# Patient Record
Sex: FEMALE | Race: WHITE | NOT HISPANIC OR LATINO | Employment: FULL TIME | URBAN - METROPOLITAN AREA
[De-identification: names, ages, dates, MRNs, and addresses within clinical notes are randomized per-mention and may not be internally consistent; named-entity substitution may affect disease eponyms.]

---

## 2017-05-10 ENCOUNTER — HOSPITAL ENCOUNTER (EMERGENCY)
Facility: HOSPITAL | Age: 37
Discharge: HOME | End: 2017-05-10
Attending: EMERGENCY MEDICINE
Payer: COMMERCIAL

## 2017-05-10 VITALS
TEMPERATURE: 98 F | SYSTOLIC BLOOD PRESSURE: 129 MMHG | OXYGEN SATURATION: 99 % | HEART RATE: 87 BPM | RESPIRATION RATE: 18 BRPM | BODY MASS INDEX: 25.71 KG/M2 | HEIGHT: 66 IN | WEIGHT: 160 LBS | DIASTOLIC BLOOD PRESSURE: 91 MMHG

## 2017-05-10 DIAGNOSIS — N92.6 ABNORMAL MENSTRUAL CYCLE: ICD-10-CM

## 2017-05-10 DIAGNOSIS — R10.31 RIGHT LOWER QUADRANT ABDOMINAL PAIN: Primary | ICD-10-CM

## 2017-05-10 DIAGNOSIS — N30.01 ACUTE CYSTITIS WITH HEMATURIA: ICD-10-CM

## 2017-05-10 LAB
AMORPH CRY URNS QL MICRO: ABNORMAL
B-HCG UR QL: NEGATIVE
BACTERIA #/AREA URNS HPF: ABNORMAL /HPF
BACTERIA GENITAL QL WET PREP: ABNORMAL
BILIRUB UR QL STRIP: NEGATIVE
CLARITY UR: ABNORMAL
CLUE CELLS VAG QL WET PREP: ABNORMAL
COLOR UR: YELLOW
CTP QC/QA: YES
FILAMENT FUNGI VAG WET PREP-#/AREA: ABNORMAL
GLUCOSE UR QL STRIP: NEGATIVE
HGB UR QL STRIP: ABNORMAL
KETONES UR QL STRIP: NEGATIVE
LEUKOCYTE ESTERASE UR QL STRIP: ABNORMAL
MICROSCOPIC COMMENT: ABNORMAL
NITRITE UR QL STRIP: NEGATIVE
NON-SQ EPI CELLS #/AREA URNS HPF: 1 /HPF
PH UR STRIP: 7 [PH] (ref 5–8)
PROT UR QL STRIP: NEGATIVE
RBC #/AREA URNS HPF: 4 /HPF (ref 0–4)
SP GR UR STRIP: 1.02 (ref 1–1.03)
SPECIMEN SOURCE: ABNORMAL
SQUAMOUS #/AREA URNS HPF: 6 /HPF
T VAGINALIS GENITAL QL WET PREP: ABNORMAL
URN SPEC COLLECT METH UR: ABNORMAL
UROBILINOGEN UR STRIP-ACNC: ABNORMAL EU/DL
WBC #/AREA URNS HPF: 20 /HPF (ref 0–5)
WBC #/AREA VAG WET PREP: ABNORMAL
YEAST GENITAL QL WET PREP: ABNORMAL

## 2017-05-10 PROCEDURE — 87210 SMEAR WET MOUNT SALINE/INK: CPT

## 2017-05-10 PROCEDURE — 87591 N.GONORRHOEAE DNA AMP PROB: CPT

## 2017-05-10 PROCEDURE — 81000 URINALYSIS NONAUTO W/SCOPE: CPT

## 2017-05-10 PROCEDURE — 25000003 PHARM REV CODE 250: Performed by: NURSE PRACTITIONER

## 2017-05-10 PROCEDURE — 99284 EMERGENCY DEPT VISIT MOD MDM: CPT | Mod: 25

## 2017-05-10 PROCEDURE — 87086 URINE CULTURE/COLONY COUNT: CPT

## 2017-05-10 PROCEDURE — 81025 URINE PREGNANCY TEST: CPT | Performed by: EMERGENCY MEDICINE

## 2017-05-10 RX ORDER — AMLODIPINE AND OLMESARTAN MEDOXOMIL 10; 40 MG/1; MG/1
1 TABLET ORAL DAILY
COMMUNITY
End: 2018-01-03

## 2017-05-10 RX ORDER — ONDANSETRON 4 MG/1
4 TABLET, ORALLY DISINTEGRATING ORAL
Status: COMPLETED | OUTPATIENT
Start: 2017-05-10 | End: 2017-05-10

## 2017-05-10 RX ORDER — CEPHALEXIN 500 MG/1
500 CAPSULE ORAL EVERY 12 HOURS
Qty: 10 CAPSULE | Refills: 0 | Status: SHIPPED | OUTPATIENT
Start: 2017-05-10 | End: 2017-05-15

## 2017-05-10 RX ORDER — ONDANSETRON 4 MG/1
4 TABLET, FILM COATED ORAL EVERY 8 HOURS PRN
Qty: 12 TABLET | Refills: 0 | Status: SHIPPED | OUTPATIENT
Start: 2017-05-10 | End: 2018-01-03

## 2017-05-10 RX ORDER — DICYCLOMINE HYDROCHLORIDE 20 MG/1
20 TABLET ORAL 2 TIMES DAILY PRN
Qty: 30 TABLET | Refills: 0 | Status: SHIPPED | OUTPATIENT
Start: 2017-05-10 | End: 2018-01-03

## 2017-05-10 RX ADMIN — ONDANSETRON 4 MG: 4 TABLET, ORALLY DISINTEGRATING ORAL at 03:05

## 2017-05-10 NOTE — DISCHARGE INSTRUCTIONS
Please return to the Emergency Department for any new or worsening symptoms including: worsening abdominal pain or changes in your abdominal pain, pain with walking, any fever, chest pain, shortness of breath, loss of consciousness, dizziness, weakness, or any other concerns.     Please follow up with Ob/GYN/. Call for an appointment Tomorrow (5/11/17).If you do not have an OB/GYN, you may contact the one listed on your discharge paperwork or you may also call the [facility] Appointment Desk at [phone] to schedule an appointment with an OB/GYN.     Please take all medication as prescribed. You have been prescribed Naproxen for pain. This is an Non-Steroidal Anti-Inflammatory (NSAID) Medication. Please do not take any additional NSAIDs while you are taking this medication including (Advil, Aleve, Motrin, Ibuprofen, Mobic\meloxicam, Naprosyn, etc.). Please stop taking this medication if you experience: weakness, itching, yellow skin or eyes, joint pains, vomiting blood, blood or black stools, unusual weight gain, or swelling in your arms, legs, hands, or feet.

## 2017-05-10 NOTE — ED AVS SNAPSHOT
[facility]  [place] [address]               Jyotsna Archuleta   5/10/2017  2:04 PM   ED    Description:  Female : 1980   Department:  [facility]           Your Care was Coordinated By:     Provider Role From To    Inder Santiago MD Attending Provider 05/10/17 5568 --    GRISEL Rowe Nurse Practitioner 05/10/17 686 --      Reason for Visit     Abdominal Pain           Diagnoses this Visit        Comments    Right lower quadrant abdominal pain    -  Primary     Abnormal menstrual cycle           ED Disposition     ED Disposition Condition Comment    Discharge             To Do List           Follow-up Information     Schedule an appointment as soon as possible for a visit with Dayanara Dunbar MD.    Specialty:  Obstetrics and Gynecology    Why:  This Week, For Follow-Up    Contact information:    [facility] [address]  [address]  [place] [address]  [phone]          Go to [facility].    Specialty:  Emergency Medicine    Why:  If symptoms worsen    Contact information:    [address] [place] [id number] [phone]       These Medications        Disp Refills Start End    cephALEXin (KEFLEX) 500 MG capsule 10 capsule 0 5/10/2017 5/15/2017    Take 1 capsule (500 mg total) by mouth every 12 (twelve) hours. - Oral    ondansetron (ZOFRAN) 4 MG tablet 12 tablet 0 5/10/2017     Take 1 tablet (4 mg total) by mouth every 8 (eight) hours as needed for Nausea. - Oral      [facility]     [facility] Nurse Care Line - 24/ Assistance  Unless otherwise directed by your provider, please contact [facility], our nurse care line that is available for 24/7 assistance.     Registered nurses in the [facility] provide: appointment scheduling, clinical advisement, health education, and other advisory services.  Call: [phone] (toll free)               Medications           Message  "regarding Medications     Verify the changes and/or additions to your medication regime listed below are the same as discussed with your clinician today.  If any of these changes or additions are incorrect, please notify your healthcare provider.        START taking these NEW medications        Refills    cephALEXin (KEFLEX) 500 MG capsule 0    Sig: Take 1 capsule (500 mg total) by mouth every 12 (twelve) hours.    Class: Print    Route: Oral    ondansetron (ZOFRAN) 4 MG tablet 0    Sig: Take 1 tablet (4 mg total) by mouth every 8 (eight) hours as needed for Nausea.    Class: Print    Route: Oral      These medications were administered today        Dose Freq    ondansetron disintegrating tablet 4 mg 4 mg ED 1 Time    Sig: Take 1 tablet (4 mg total) by mouth ED 1 Time.    Class: Normal    Route: Oral           Verify that the below list of medications is an accurate representation of the medications you are currently taking.  If none reported, the list may be blank. If incorrect, please contact your healthcare provider. Carry this list with you in case of emergency.           Current Medications     amlodipine-olmesartan (JUDIT) 10-40 mg per tablet Take 1 tablet by mouth once daily.    cephALEXin (KEFLEX) 500 MG capsule Take 1 capsule (500 mg total) by mouth every 12 (twelve) hours.    ondansetron (ZOFRAN) 4 MG tablet Take 1 tablet (4 mg total) by mouth every 8 (eight) hours as needed for Nausea.           Clinical Reference Information           Your Vitals Were     BP Pulse Temp Resp Height Weight    129/91 (BP Location: Right arm, Patient Position: Sitting, BP Method: Automatic) 87 98.4 °F (36.9 °C) (Oral) 18 5' 6" (1.676 m) 72.6 kg (160 lb)    Last Period SpO2 BMI          04/03/2017 99% 25.82 kg/m2        Allergies as of 5/10/2017     No Known Allergies      Immunizations Administered on Date of Encounter - 5/10/2017     None      ED Micro, Lab, POCT     Start Ordered       Status Ordering Provider    05/10/17 " 1524 05/10/17 1523  Urine culture **CANNOT BE ORDERED STAT**  Once      In process     05/10/17 1428 05/10/17 1427  C. trachomatis/N. gonorrhoeae by AMP DNA Cervix  STAT      In process     05/10/17 1428 05/10/17 1427  Vaginal Screen Vagina  STAT      Final result     05/10/17 1427 05/10/17 1427  Urinalysis Clean Catch  STAT      Final result     05/10/17 1427 05/10/17 1427  Urinalysis Microscopic  Once      Final result     05/10/17 1236 05/10/17 1235  POCT urine pregnancy  Once      Final result       ED Imaging Orders     None        Discharge Instructions       Please return to the Emergency Department for any new or worsening symptoms including: worsening abdominal pain or changes in your abdominal pain, pain with walking, any fever, chest pain, shortness of breath, loss of consciousness, dizziness, weakness, or any other concerns.     Please follow up with Ob/GYN/. Call for an appointment Tomorrow (5/11/17).If you do not have an OB/GYN, you may contact the one listed on your discharge paperwork or you may also call the [facility] Appointment Desk at [phone] to schedule an appointment with an OB/GYN.     Please take all medication as prescribed. You have been prescribed Naproxen for pain. This is an Non-Steroidal Anti-Inflammatory (NSAID) Medication. Please do not take any additional NSAIDs while you are taking this medication including (Advil, Aleve, Motrin, Ibuprofen, Mobic\meloxicam, Naprosyn, etc.). Please stop taking this medication if you experience: weakness, itching, yellow skin or eyes, joint pains, vomiting blood, blood or black stools, unusual weight gain, or swelling in your arms, legs, hands, or feet.       Discharge References/Attachments     ABDOMINAL PAIN, ADULT (ENGLISH)      [facility] Sign-Up     Activating your [facility] account is as easy as 1-2-3!     1) Visit [email or web address], select Sign Up Now, enter this activation code and your date of birth, then select  Next.  [email or web address]  Expires: 6/24/2017  4:42 PM      2) Create a username and password to use when you visit [facility] in the future and select a security question in case you lose your password and select Next.    3) Enter your e-mail address and click Sign Up!    Additional Information  If you have questions, please e-mail [email or web address] or call [phone] to talk to our [facility] staff. Remember, [facility] is NOT to be used for urgent needs. For medical emergencies, dial 911.         Smoking Cessation     If you would like to quit smoking:   You may be eligible for free services if you are a [place] resident and started smoking cigarettes before September 1, 1988.  Call the Smoking Cessation Trust (SCT) toll free at (095) 807-5344 or (505) 275-8684.   Call 1-800-QUIT-NOW if you do not meet the above criteria.   Contact us via email: [email or web address]   View our website for more information: [email or web address]         [facility] complies with applicable Federal civil rights laws and does not discriminate on the basis of race, color, national origin, age, disability, or sex.        Language Assistance Services     ATTENTION: Language assistance services are available, free of charge. Please call [phone].      ATENCIÓN: Si habla español, tiene a delagdo disposición servicios gratuitos de asistencia lingüística. Llame al [phone].     CHÚ Ý: N?u b?n nói Ti?ng Vi?t, có các d?ch v? h? tr? ngôn ng? mi?n phí dành cho b?n. G?i s? [phone].

## 2017-05-10 NOTE — ED TRIAGE NOTES
Nausea lower pelvic pain for 2 days no vomiting or diarrhea feels constipated last bm 3-4 days ago

## 2017-05-10 NOTE — ED PROVIDER NOTES
Encounter Date: 5/10/2017    SCRIBE #1 NOTE: I, Janiya Tijerina, am scribing for, and in the presence of,  Slim Mancini NP. I have scribed the following portions of the note - Other sections scribed: ROS, HPI.       History     Chief Complaint   Patient presents with    Abdominal Pain     Pt. c/o lower pelvic pain that began two days ago accompanied by nausea. 9 days late on period.       Review of patient's allergies indicates:  No Known Allergies  HPI Comments: CC: Abdominal Pain    HPI: Patient is a 36 y.o. F with a past medical history of Hypertension who presents to the ED for evaluation of acute R suprapubic abdominal pain x2 days with associated nausea and constipation (last BM 3 days ago). Pain is severe and constant. No symptomatic treatment PTA. She adds that her menstrual period is 9 days late, which is unusual for her. However, multiple UPTs were negative. Patient denies fever, chills, and/or vomiting. She reports a history of a cyst. She is not on oral contraceptives. Dr. Nieto was her OB GYN.      The history is provided by the patient. No  was used.     Past Medical History:   Diagnosis Date    Hypertension      History reviewed. No pertinent surgical history.  History reviewed. No pertinent family history.  Social History   Substance Use Topics    Smoking status: Current Every Day Smoker    Smokeless tobacco: None    Alcohol use No     Review of Systems   Constitutional: Negative for chills and fever.   HENT: Negative for sore throat.    Eyes: Negative for redness.   Respiratory: Negative for shortness of breath.    Cardiovascular: Negative for chest pain.   Gastrointestinal: Positive for abdominal pain (R suprapubic), constipation and nausea. Negative for vomiting.   Genitourinary: Positive for menstrual problem. Negative for dysuria.   Musculoskeletal: Negative for myalgias.   Skin: Negative for rash.   Neurological: Negative for headaches.       Physical Exam   Initial  Vitals   BP Pulse Resp Temp SpO2   05/10/17 1220 05/10/17 1220 05/10/17 1220 05/10/17 1220 05/10/17 1220   146/85 114 17 98.6 °F (37 °C) 100 %     Physical Exam    Nursing note and vitals reviewed.  Constitutional: She appears well-developed and well-nourished. She is not diaphoretic. She is cooperative.  Non-toxic appearance. No distress.   HENT:   Head: Normocephalic and atraumatic.   Right Ear: Tympanic membrane and external ear normal. Tympanic membrane is not erythematous.   Left Ear: Tympanic membrane and external ear normal. Tympanic membrane is not erythematous.   Nose: Nose normal.   Mouth/Throat: Uvula is midline, oropharynx is clear and moist and mucous membranes are normal. No trismus in the jaw.   Eyes: Conjunctivae and EOM are normal.   Neck: Normal range of motion. No tracheal deviation present.   Cardiovascular: Normal rate, regular rhythm and normal heart sounds. Exam reveals no gallop and no friction rub.    No murmur heard.  Pulses:       Radial pulses are 2+ on the right side, and 2+ on the left side.   Pulmonary/Chest: Effort normal and breath sounds normal. No stridor. No tachypnea and no bradypnea. No respiratory distress. She has no wheezes. She has no rhonchi. She has no rales. She exhibits no tenderness.   Abdominal: Soft. Normal appearance and bowel sounds are normal. She exhibits no distension, no abdominal bruit and no mass. There is tenderness (mild) in the right lower quadrant and suprapubic area. There is no rigidity, no rebound, no guarding, no CVA tenderness, no tenderness at McBurney's point and negative Messina's sign.   Mild tenderness in the suprapubic and right lower quadrant/pelvic area.  There is no rebound, guarding, or peritoneal signs noted.  The patient is able to jump up and down without any signs of distress.   Genitourinary: Pelvic exam was performed with patient supine. There is no rash, tenderness or lesion on the right labia. There is no rash, tenderness or lesion on  the left labia. Cervix exhibits no motion tenderness, no discharge and no friability. Right adnexum displays tenderness (mild). Right adnexum displays no mass and no fullness. Left adnexum displays no mass, no tenderness and no fullness. No erythema, tenderness or bleeding in the vagina. No foreign body in the vagina. No signs of injury around the vagina. Vaginal discharge (scant/thin/white) found.   Genitourinary Comments: Exam chaperoned by: MACHELLE Rao. Cervical Os: Closed. On pelvic exam - TTP in the mid low suprapubic > R adnexal. No fullness or masses noted on exam.    Neurological: She is alert and oriented to person, place, and time. She has normal strength.   Skin: Skin is warm, dry and intact. No rash noted. No cyanosis or erythema. Nails show no clubbing.   Psychiatric: She has a normal mood and affect. Her behavior is normal. Judgment and thought content normal.         ED Course   Procedures  Labs Reviewed   URINALYSIS - Abnormal; Notable for the following:        Result Value    Appearance, UA Cloudy (*)     Occult Blood UA 1+ (*)     Urobilinogen, UA 4.0-6.0 (*)     Leukocytes, UA 3+ (*)     All other components within normal limits   VAGINAL SCREEN - Abnormal; Notable for the following:     WBC - Vaginal Screen Rare (*)     Bacteria - Vaginal Screen Few (*)     All other components within normal limits   URINALYSIS MICROSCOPIC - Abnormal; Notable for the following:     WBC, UA 20 (*)     Bacteria, UA Few (*)     Non-Squam Epith 1 (*)     All other components within normal limits   C. TRACHOMATIS/N. GONORRHOEAE BY AMP DNA   CULTURE, URINE   POCT URINE PREGNANCY             Medical Decision Making:   Clinical Tests:   Lab Tests: Ordered and Reviewed       APC / Resident Notes:   This is an evaluation of a 36-year-old female that presents emergency Department with complaints of right low pelvic/abdominal pain and suprapubic pain that began 2 days ago.  She describes the pain as intermittent aching and  kirby.  She reports some associated nausea with no vomiting or fevers.  She also reports being 9 days late on her menstrual cycle.  No hormone therapy or OCPs.  She reports her cycles are usually regular. Physical Exam shows a non-toxic, afebrile, and well appearing female.  She has mild tenderness in the suprapubic and right lower abdomen/pelvic area.  There is rebound, guarding, or masses.  There are no peritoneal signs.  Bowel sounds are regular.  Remainder the abdomen is soft and nontender.  Pelvic exam with more tenderness in the suprapubic abdomen than the right pelvic area.  There is no fullness or masses noted on palpation of the right adnexa.  The patient's triage heart rate was 114 bpm.  Reassessment showed improved heart rate. RESULTS: Few bacteria, rare WBCs.  Urine with +1 occult blood, 3+ leukocytes, 20 WBCs, few bacteria.  UPT negative.  Urine culture pending.  GC culture pending.  Patient denies concern for STI's.    My overall impression is abdominal pain, abnormal menstrual cycle, and UTI. I considered, but at this time, do not suspect bowel obstruction, bowel perforation, pregnancy, ectopic pregnancy, pyelonephritis, appendicitis, TOA, ovarian torsion.     ED Course: Zofran. D/C Meds: Keflex, Bentyl, and naproxen. Additional D/C Information: OB/GYN follow-up, abdominal pain precautions. The diagnosis, treatment plan, instructions for follow-up and reevaluation with OB/GYN for further evaluation of her pain and change in menstrual cycles as well as ED return precautions were discussed and understanding was verbalized. All questions or concerns have been addressed. This case was discussed with and the patient has been examined by Dr. Santiago who is in agreement with my assessment and plan. JAMES Rankin, FNP-C        Scribe Attestation:   Scribe #1: I performed the above scribed service and the documentation accurately describes the services I performed. I attest to the accuracy of the  note.    Attending Attestation:     Physician Attestation Statement for NP/PA:   I have conducted a face to face encounter with this patient in addition to the NP/PA, due to NP/PA Request    Other NP/PA Attestation Additions:    History of Present Illness: Abdominal pain--feels like premenstrual cramps    Medical Decision Making: I HAVE REVIEWED THIS CASE WITH MY ADVANCED PRACTICE CLINICIAN.      I HAVE PROVIDED A FACE TO FACE EVALUATION OF THIS PATIENT AT THE REQUEST OF MY ADVANCED PRACTICE CLINICIAN.      REASON:  MEDICAL COMPLEXITY    THE PATIENT'S CONDITION WARRANTED PHYSICIAN INVOLVEMENT.  THE TREATMENT REGIMEN WAS REVIEWED BY ME.  I AGREE WITH THE HISTORY, ROS, PHYSICAL, ASSESSMENT AND PLAN OF CARE AS DOCUMENTED BY MY ADVANCED PRACTICE CLINICIAN.      Pt has tenderness in the right lower quadrant but has no guarding or rebound.  Decreased appetite.  No fever or toxic appearance after two days of symptoms.  Able to walk around room and change position with ease.    We discussed the possibility of appendicitis.  She does have some symptoms, but given her duration of symptoms, I'm not convinced that she needs a CT scan today.  In addition, she feels as if her parents are associated with her premenstrual cramps.  She has not started her period.  She is 9 days late.  Her UPT is negative.  She was strongly advised to return if she has any change or worsening symptoms including worsening pain or development of fever.  She understood these directions.  She was discharged in stable condition.       Physician Attestation for Scribe:  Physician Attestation Statement for Scribe #1: I, Slim Mancini, NP, reviewed documentation, as scribed by Janiya Tijerina in my presence, and it is both accurate and complete.                 ED Course     Clinical Impression:   The primary encounter diagnosis was Right lower quadrant abdominal pain. Diagnoses of Abnormal menstrual cycle and Acute cystitis with hematuria were also pertinent  to this visit.    Disposition:   Disposition: Discharged  Condition: Stable       GRISEL Rowe  05/10/17 1921       Inder Santiago MD  05/10/17 2110

## 2017-05-11 LAB
C TRACH DNA SPEC QL NAA+PROBE: NOT DETECTED
N GONORRHOEA DNA SPEC QL NAA+PROBE: NOT DETECTED

## 2017-05-12 LAB — BACTERIA UR CULT: NORMAL

## 2017-05-22 ENCOUNTER — TELEPHONE (OUTPATIENT)
Dept: OBSTETRICS AND GYNECOLOGY | Facility: CLINIC | Age: 37
End: 2017-05-22

## 2017-05-22 NOTE — TELEPHONE ENCOUNTER
----- Message from Adeline Bee sent at 5/11/2017  5:13 PM CDT -----  Contact: pt   Pt would like to be called back regarding scheduling an appt.Pt would like a blood test. Pt will be a new Pt.        Pt can be reached at [phone].

## 2017-05-22 NOTE — TELEPHONE ENCOUNTER
Called and spoke with pt regarding her request.  Pt cancelled a previous appt made since she didn't needed an appt any more for that particular issue.  Pt would like to find an OB/GYN but currently on her cycle.  Pt was advise to utilize the patient portal to make her own appt according to her convenience and choose the same doctor she has already chosen.  Pt was also informed that  is out of the office this week but should be back next week.  Pt voiced her understanding and appreciated office for contacting her. obie

## 2017-12-28 ENCOUNTER — TELEPHONE (OUTPATIENT)
Dept: MATERNAL FETAL MEDICINE | Facility: CLINIC | Age: 37
End: 2017-12-28

## 2017-12-28 NOTE — TELEPHONE ENCOUNTER
Patient asking if we would do her OB care.  Patient informed that she would need to start with a general Ob then they would refer her to us.  Patient given number to OB/GYN clinic.  Patient verbalized her understanding.      ----- Message from Ezio Bey sent at 12/28/2017 11:24 AM CST -----  Contact: pt  x_ 1st Request  _ 2nd Request  _ 3rd Request    Who: pt    Why: is needing to schedule a appointment    What Number to Call Back: [phone]    When to Expect a call back: (Before the end of the day)  -- if call after 3:00 call back will be tomorrow.

## 2018-01-03 ENCOUNTER — HOSPITAL ENCOUNTER (EMERGENCY)
Facility: OTHER | Age: 38
Discharge: HOME | End: 2018-01-03
Attending: EMERGENCY MEDICINE
Payer: COMMERCIAL

## 2018-01-03 VITALS
BODY MASS INDEX: 28.93 KG/M2 | DIASTOLIC BLOOD PRESSURE: 93 MMHG | RESPIRATION RATE: 16 BRPM | TEMPERATURE: 98 F | SYSTOLIC BLOOD PRESSURE: 125 MMHG | HEART RATE: 91 BPM | OXYGEN SATURATION: 98 % | WEIGHT: 180 LBS | HEIGHT: 66 IN

## 2018-01-03 DIAGNOSIS — O20.0 THREATENED MISCARRIAGE IN EARLY PREGNANCY: Primary | ICD-10-CM

## 2018-01-03 LAB
ABO + RH BLD: NORMAL
B-HCG UR QL: POSITIVE
BILIRUBIN, POC UA: ABNORMAL
BLOOD, POC UA: NEGATIVE
CLARITY, POC UA: CLEAR
COLOR, POC UA: ABNORMAL
CTP QC/QA: YES
GLUCOSE, POC UA: NEGATIVE
KETONES, POC UA: ABNORMAL
LEUKOCYTE EST, POC UA: NEGATIVE
NITRITE, POC UA: NEGATIVE
PH UR STRIP: 7.5 [PH]
POC BETA-HCG (QUANT): >2000 IU/L
PROTEIN, POC UA: ABNORMAL
SAMPLE: NORMAL
SPECIFIC GRAVITY, POC UA: 1.02
UROBILINOGEN, POC UA: 4 E.U./DL

## 2018-01-03 PROCEDURE — 81003 URINALYSIS AUTO W/O SCOPE: CPT

## 2018-01-03 PROCEDURE — 99284 EMERGENCY DEPT VISIT MOD MDM: CPT | Mod: 25

## 2018-01-03 PROCEDURE — 85025 COMPLETE CBC W/AUTO DIFF WBC: CPT

## 2018-01-03 PROCEDURE — 84702 CHORIONIC GONADOTROPIN TEST: CPT

## 2018-01-03 PROCEDURE — 86901 BLOOD TYPING SEROLOGIC RH(D): CPT

## 2018-01-03 PROCEDURE — 81025 URINE PREGNANCY TEST: CPT | Performed by: EMERGENCY MEDICINE

## 2018-01-03 RX ORDER — METHYLDOPA 500 MG/1
500 TABLET, FILM COATED ORAL 2 TIMES DAILY
COMMUNITY

## 2018-01-04 NOTE — ED PROVIDER NOTES
Encounter Date: 1/3/2018       History     Chief Complaint   Patient presents with    Possible Pregnancy     6w2d gestation,     Vaginal Bleeding     reports dark red tinged mucous on tissue ~ 2.5hrs ago    Flank Pain     right flank cramping, radiating to RLQ abd. onset 1230     Chief complaint: Vaginal spotting  37-year-old  4 para 3 at 6 weeks gestational age complains of vaginal spotting.  Patient said that she had blood on the tissue after she urinated around 12:30 today.  She's had intermittent cramps to her lower abdomen as well.  Patient does have prenatal care with Dr. Rj Chirinos.  She had an appointment last week.  She said she had an ultrasound but no heartbeat was seen.  She denies cramps at this time.  No heavy bleeding.  She is nauseated.      The history is provided by the patient.     Review of patient's allergies indicates:  No Known Allergies  Past Medical History:   Diagnosis Date    Hypertension      History reviewed. No pertinent surgical history.  History reviewed. No pertinent family history.  Social History   Substance Use Topics    Smoking status: Current Every Day Smoker    Smokeless tobacco: Never Used    Alcohol use No     Review of Systems   Constitutional: Negative for fever.   Gastrointestinal: Positive for abdominal pain and nausea.   Genitourinary: Positive for vaginal bleeding (spotting).   Musculoskeletal: Positive for back pain.   All other systems reviewed and are negative.      Physical Exam     Initial Vitals [18 1634]   BP Pulse Resp Temp SpO2   (!) 147/93 96 16 98.8 °F (37.1 °C) 100 %      MAP       111         Physical Exam    Nursing note and vitals reviewed.  Constitutional: She appears well-developed and well-nourished.   HENT:   Head: Normocephalic and atraumatic.   Eyes: Conjunctivae and EOM are normal. Pupils are equal, round, and reactive to light.   Neck: Normal range of motion. Neck supple.   Cardiovascular: Normal rate and regular  rhythm.   Pulmonary/Chest: Breath sounds normal.   Abdominal: Soft. There is no tenderness. There is no rebound and no guarding.   Genitourinary: Vagina normal. Cervix exhibits no motion tenderness. Right adnexum displays no tenderness. Left adnexum displays no tenderness. No tenderness or bleeding in the vagina. No vaginal discharge found.   Musculoskeletal: Normal range of motion.   Neurological: She is alert and oriented to person, place, and time. She has normal strength.   Skin: Skin is warm and dry.   Psychiatric: She has a normal mood and affect.         ED Course   Procedures  Labs Reviewed   POCT URINE PREGNANCY - Abnormal; Notable for the following:        Result Value    POC Preg Test, Ur Positive (*)     All other components within normal limits   POCT URINALYSIS W/O SCOPE - Abnormal; Notable for the following:     Glucose, UA Negative (*)     Bilirubin, UA 1+ (*)     Ketones, UA Trace (*)     Blood, UA Negative (*)     Protein, UA Trace (*)     Urobilinogen, UA 4.0 (*)     Nitrite, UA Negative (*)     Leukocytes, UA Negative (*)     All other components within normal limits   POCT URINALYSIS W/O SCOPE   POCT CBC   GROUP & RH   POCT B-HCG (QUANT)   POCT B-HCG (QUANT)             Medical Decision Making:   Initial Assessment:   37-year-old an early pregnancy presents with spotting earlier today.  On my exam patient's abdomen is soft and nontender.  She has no spotting currently.  ED Management:  Patient is unaware of her blood type.  However she said that she has never had to have a RhoGAM shot in  previous pregnancies.  Blood type will be sent.  Quantitative beta hCG and CBC will also be sent pelvic ultrasound will be done.  Patient has no bleeding on my exam.   Quantitative beta hCG is greater than 2000.  Care will be turned over to Dr. Dawn at 1900 with ABO blood type and ultrasound pending    Ultrasonography reveals a single live intrauterine pregnancy at approximately 6 weeks.  The patient is  Rh+        Results for orders placed or performed during the hospital encounter of 01/03/18   POCT urine pregnancy   Result Value Ref Range    POC Preg Test, Ur Positive (A) Negative     Acceptable Yes    POCT URINALYSIS W/O SCOPE   Result Value Ref Range    Glucose, UA Negative (NG)     Bilirubin, UA 1+ (A)     Ketones, UA Trace (A)     Spec Grav UA 1.020     Blood, UA Negative (NG)     PH, UA 7.5     Protein, UA Trace (A)     Urobilinogen, UA 4.0 (A) E.U./dL    Nitrite, UA Negative (NG)     Leukocytes, UA Negative (NG)     Color, UA Dark yellow     Clarity, UA Clear    ABO/Rh   Result Value Ref Range    Group & Rh A POS    POCT bHCG (Quant)   Result Value Ref Range    POC Beta-HCG (Quant) >2000.0 IU/L    Sample ROLDAN            Imaging Results          US OB Less Than 14 Wks with Transvag(xpd (Final result)  Result time 01/03/18 20:17:33    Final result by Manpreet Hernandez MD (01/03/18 20:17:33)                 Impression:        Single live IUP with an average age of 6 weeks and 4 days.    Probable small subchorionic hematoma without significant mass effect      Electronically signed by: MANPREET HERNANDEZ MD, MD  Date:     01/03/18  Time:    20:17              Narrative:    Comparison: None.    Findings: Transabdominal and transvaginal pelvic ultrasound performed.  The uterus measures 9.9 cm in length and 6.1 x 7.7 cm in transverse dimensions. There is a single gestational sac identified within the upper uterine cavity with a mean diameter of 1.7 cm, containing a small yolk sac and fetal pole with crown rump length of 7 mm. Fetal heart rate is 122 BPM. Average gestational age by ultrasound is 6 weeks and 4 days plus/-0 weeks and 4 days, with estimated due date of 8/25/18. There is a 6 x 5 x 8mm hypoechoic area adjacent to the gestational sac suggestive of a small subchorionic hematoma. 1.1 cm simple appearing nabothian cyst noted. No discrete uterine fibroids identified.  The ovaries are normal in size and  appearance.  The right ovary measures 2.5 x 1.8 x 2.7 cm.  The left ovary measures 3.3 x 3 x 2.6 cm.  Arterial and venous flow demonstrated in both ovaries.  No significant amount of free fluid identified.                                       ED Course      Clinical Impression:   The encounter diagnosis was Threatened miscarriage in early pregnancy.                           Jeff Dawn MD  01/03/18 2026

## 2018-02-12 ENCOUNTER — HOSPITAL ENCOUNTER (EMERGENCY)
Facility: OTHER | Age: 38
Discharge: HOME | End: 2018-02-12
Attending: EMERGENCY MEDICINE
Payer: COMMERCIAL

## 2018-02-12 VITALS
HEART RATE: 104 BPM | TEMPERATURE: 99 F | WEIGHT: 174.19 LBS | HEIGHT: 66 IN | RESPIRATION RATE: 19 BRPM | OXYGEN SATURATION: 98 % | BODY MASS INDEX: 27.99 KG/M2 | DIASTOLIC BLOOD PRESSURE: 82 MMHG | SYSTOLIC BLOOD PRESSURE: 122 MMHG

## 2018-02-12 DIAGNOSIS — J06.9 URI WITH COUGH AND CONGESTION: Primary | ICD-10-CM

## 2018-02-12 LAB
CTP QC/QA: YES
FLUAV AG NPH QL: NEGATIVE
FLUBV AG NPH QL: NEGATIVE

## 2018-02-12 PROCEDURE — 87804 INFLUENZA ASSAY W/OPTIC: CPT

## 2018-02-12 PROCEDURE — 99283 EMERGENCY DEPT VISIT LOW MDM: CPT

## 2018-02-12 NOTE — ED PROVIDER NOTES
Encounter Date: 2/12/2018       History     Chief Complaint   Patient presents with    Fever     no meds taken today    Nasal Congestion    Cough     + post nasal drip, reports vomiting from drip    Headache     states did not take htn med today. reports 12 weeks pregnant. + smoker     The history is provided by the patient. No  was used.   Sinusitis    This is a new problem. The current episode started yesterday. The problem has been gradually worsening. The pain is at a severity of 3/10. Associated symptoms include chills, congestion, sinus pressure and cough. Pertinent negatives include no sweats, no ear pain, no hoarse voice, no sore throat, no swollen glands and no shortness of breath. She has tried nothing for the symptoms.     Review of patient's allergies indicates:  No Known Allergies  Past Medical History:   Diagnosis Date    Hypertension      History reviewed. No pertinent surgical history.  History reviewed. No pertinent family history.  Social History   Substance Use Topics    Smoking status: Current Every Day Smoker    Smokeless tobacco: Never Used    Alcohol use No     Review of Systems   Constitutional: Positive for chills and fever. Negative for appetite change.   HENT: Positive for congestion and sinus pressure. Negative for dental problem, ear discharge, ear pain, hearing loss, hoarse voice, mouth sores, rhinorrhea, sore throat and trouble swallowing.    Eyes: Negative.  Negative for pain and discharge.   Respiratory: Positive for cough. Negative for shortness of breath.    Cardiovascular: Negative.  Negative for chest pain.   Gastrointestinal: Negative.  Negative for abdominal distention, abdominal pain, constipation, diarrhea, nausea, rectal pain and vomiting.   Endocrine: Negative.    Genitourinary: Negative.  Negative for dyspareunia, dysuria, hematuria, vaginal bleeding, vaginal discharge and vaginal pain.   Musculoskeletal: Positive for myalgias. Negative for back  pain and neck pain.   Skin: Negative.  Negative for rash.   Allergic/Immunologic: Negative.    Neurological: Negative.  Negative for facial asymmetry, speech difficulty, weakness and light-headedness.   Hematological: Negative.  Does not bruise/bleed easily.   Psychiatric/Behavioral: Negative.  Negative for agitation, dysphoric mood and sleep disturbance.   All other systems reviewed and are negative.      Physical Exam     Initial Vitals [02/12/18 1524]   BP Pulse Resp Temp SpO2   (!) 163/107 (!) 113 16 98.2 °F (36.8 °C) 99 %      MAP       125.67         Physical Exam    Nursing note and vitals reviewed.  Constitutional: She appears well-developed and well-nourished. She is not diaphoretic.  Non-toxic appearance. She does not appear ill. No distress.   HENT:   Head: Normocephalic and atraumatic.   Nose: Mucosal edema present. Right sinus exhibits no maxillary sinus tenderness and no frontal sinus tenderness. Left sinus exhibits no maxillary sinus tenderness and no frontal sinus tenderness.   Mouth/Throat: Uvula is midline, oropharynx is clear and moist and mucous membranes are normal. No oropharyngeal exudate, posterior oropharyngeal edema, posterior oropharyngeal erythema or tonsillar abscesses.   Eyes: Conjunctivae are normal. Right eye exhibits no discharge. Left eye exhibits no discharge.   Neck: Normal range of motion.   Cardiovascular: Normal rate, regular rhythm, normal heart sounds and intact distal pulses. Exam reveals no gallop and no friction rub.    No murmur heard.  Pulmonary/Chest: Breath sounds normal. No respiratory distress. She has no wheezes. She has no rhonchi. She has no rales. She exhibits no tenderness.   Musculoskeletal: Normal range of motion.   Neurological: She is alert and oriented to person, place, and time.   Skin: Skin is warm and dry. No rash noted.   Psychiatric: She has a normal mood and affect. Her behavior is normal. Judgment and thought content normal.         ED Course    Procedures  Labs Reviewed   POCT INFLUENZA A/B             Medical Decision Making:   Initial Assessment:   URI with cough and congestion  Differential Diagnosis:   Influenza, sinusitis, bronchitis  ED Management:  1) Pt instructed that her symptoms are likely viral and should subside on their own  2) Pt instructed to drink plenty of fluids to loosen secretions  3) Pt instructed that he may take over-the-counter Mucinex (NOT DM) as needed  4) Pt instructed to take over-the-counter Tylenol for fever/body aches   5) Pt instructed to return to ER as needed if symptoms worsen/fail to improve  6) Pt instructed to follow-up with primary care provider  7) Pt verbalized understanding of discharge instructions and treatment plan                  Attending Attestation:     Physician Attestation Statement for NP/PA:   I reviewed the chart but I did not personally examine the patient. The face to face encounter was performed by the NP/PA.                  ED Course      Clinical Impression:   The encounter diagnosis was URI with cough and congestion.                           Toussaint Battley III, GRISEL  02/12/18 [id number]       Maria Guadalupe Rodriguez MD  02/12/18 [id number]

## 2018-02-12 NOTE — ED TRIAGE NOTES
Nasal congestion, post nasal drip, note- 12 weeks preg.  Reports sinus drainage is making her have n/v.  Also reporting headache and cough

## 2018-03-26 ENCOUNTER — HOSPITAL ENCOUNTER (EMERGENCY)
Facility: HOSPITAL | Age: 38
Discharge: HOME | End: 2018-03-26
Attending: EMERGENCY MEDICINE
Payer: MEDICAID

## 2018-03-26 VITALS
HEART RATE: 104 BPM | OXYGEN SATURATION: 99 % | TEMPERATURE: 99 F | DIASTOLIC BLOOD PRESSURE: 80 MMHG | RESPIRATION RATE: 20 BRPM | BODY MASS INDEX: 27.64 KG/M2 | HEIGHT: 66 IN | WEIGHT: 172 LBS | SYSTOLIC BLOOD PRESSURE: 124 MMHG

## 2018-03-26 DIAGNOSIS — R10.12 LUQ ABDOMINAL PAIN: ICD-10-CM

## 2018-03-26 DIAGNOSIS — Z3A.18 18 WEEKS GESTATION OF PREGNANCY: Primary | ICD-10-CM

## 2018-03-26 PROCEDURE — 99283 EMERGENCY DEPT VISIT LOW MDM: CPT

## 2018-03-26 RX ORDER — MAG HYDROX/ALUMINUM HYD/SIMETH 200-200-20
30 SUSPENSION, ORAL (FINAL DOSE FORM) ORAL
Status: DISCONTINUED | OUTPATIENT
Start: 2018-03-26 | End: 2018-03-26 | Stop reason: HOSPADM

## 2018-03-26 NOTE — ED PROVIDER NOTES
Encounter Date: 3/26/2018    SCRIBE #1 NOTE: I, Low Mandujano, am scribing for, and in the presence of,  Haim Abrams PA-C. I have scribed the following portions of the note - Other sections scribed: HPI, ROS.       History     Chief Complaint   Patient presents with    Spasms     left sided abdominal spasms every 15 minutes since yesterday at 430 pm. Pt is 18 weeks pregnant    Nausea     Has not eaten since yesterday     CC: Abdominal Pain    38 y/o 18 wks gravid female (, P:3, A:0) with HTN presents to the ED c/o acute onset LUQ abdominal cramping and nausea that started at 4:30PM yesterday. The pain is severe (8/10). The patient's OBGYN is Dr. Owen Cash at . The patient denies similar symptoms in the past. The patient denies emesis, diarrhea, chest pain, SOB, or fever. No attempted treatment reported. No other symptoms reported.      The history is provided by the patient. No  was used.     Review of patient's allergies indicates:  No Known Allergies  Past Medical History:   Diagnosis Date    Hypertension      History reviewed. No pertinent surgical history.  History reviewed. No pertinent family history.  Social History   Substance Use Topics    Smoking status: Current Every Day Smoker    Smokeless tobacco: Never Used    Alcohol use No     Review of Systems   Constitutional: Negative for chills and fever.   HENT: Negative for rhinorrhea.    Eyes: Negative for redness.   Respiratory: Negative for cough and shortness of breath.    Cardiovascular: Negative for chest pain.   Gastrointestinal: Positive for abdominal pain (LUQ cramping) and nausea. Negative for diarrhea and vomiting.   Genitourinary: Negative for difficulty urinating and dysuria.   Musculoskeletal: Negative for back pain.   Skin: Negative for rash.   Neurological: Negative for headaches.       Physical Exam     Initial Vitals [18 1223]   BP Pulse Resp Temp SpO2   137/85 102 20 98.6 °F (37 °C) 98 %       MAP       102.33         Physical Exam    Nursing note and vitals reviewed.  Constitutional: She appears well-developed and well-nourished. She is not diaphoretic. No distress.   HENT:   Head: Normocephalic and atraumatic.   Nose: Nose normal.   Mouth/Throat: Oropharynx is clear and moist.   Eyes: Conjunctivae and EOM are normal. Right eye exhibits no discharge. Left eye exhibits no discharge.   Neck: Normal range of motion. No tracheal deviation present. No JVD present.   Cardiovascular: Normal rate, regular rhythm and normal heart sounds. Exam reveals no friction rub.    No murmur heard.  Pulmonary/Chest: Breath sounds normal. No stridor. No respiratory distress. She has no wheezes. She has no rhonchi. She has no rales. She exhibits no tenderness.   Abdominal: Soft. She exhibits no distension. There is tenderness (very mild LUQ). There is no rigidity, no rebound, no guarding, no CVA tenderness, no tenderness at McBurney's point and negative Messina's sign.   Musculoskeletal: Normal range of motion.   Neurological: She is alert and oriented to person, place, and time.   Skin: Skin is warm and dry. No rash and no abscess noted. No erythema. No pallor.         ED Course   Procedures  Labs Reviewed - No data to display          Medical Decision Making:   History:   Old Medical Records: I decided to obtain old medical records.  Initial Assessment:   36 yo F, 18 weeks gravid with IUP confirmed on US presents to ED for LUQ abdominal pain. Denies trauma, fever, emesis, CP, and SOB. Minimal TTP.   ED Management:  I am unsure of the etiology of this patient's symptoms, however, I do not believe they are of emergent etiology at this time. May be muscle strain vs. Acid reflux. I doubt cardiac and pulmonary etiology. I doubt acute cholecystitis and pancreatitis. Bedside US shows good fetal activity.     Patient declines medication in the ED to attempt symptomatic care, stating she needs to go to work in 30 minutes and doesn't  want further evaluation. Advising OBGYN follow up. Strict return precautions discussed. Patient agreeable to plan.   Other:   I have discussed this case with another health care provider.       <> Summary of the Discussion: Case discussed with Dr. Chau who also evaluated the patient and is in agreement with my assessment and plan.             Scribe Attestation:   Scribe #1: I performed the above scribed service and the documentation accurately describes the services I performed. I attest to the accuracy of the note.    Attending Attestation:           Physician Attestation for Scribe:  Physician Attestation Statement for Scribe #1: I, Haim Abrams PA-C, reviewed documentation, as scribed by Low Mandujano in my presence, and it is both accurate and complete.                    Clinical Impression:   The primary encounter diagnosis was 18 weeks gestation of pregnancy. A diagnosis of LUQ abdominal pain was also pertinent to this visit.    Disposition:   Disposition: Discharged  Condition: Stable                        Haim Dowling PA-C  03/26/18 [id number]

## 2018-03-26 NOTE — ED TRIAGE NOTES
Reports 18 weeks pregnant.  C/o rt .abd spasm every 15 mins.since yesterday Anitha when lying down. C/o nausea, poor appetite. Feeling hot and sweating.

## 2018-12-26 ENCOUNTER — HOSPITAL ENCOUNTER (EMERGENCY)
Facility: HOSPITAL | Age: 38
Discharge: HOME | End: 2018-12-26
Attending: EMERGENCY MEDICINE
Payer: MEDICAID

## 2018-12-26 VITALS
WEIGHT: 174 LBS | HEART RATE: 99 BPM | SYSTOLIC BLOOD PRESSURE: 121 MMHG | TEMPERATURE: 100 F | HEIGHT: 66 IN | DIASTOLIC BLOOD PRESSURE: 89 MMHG | BODY MASS INDEX: 27.97 KG/M2 | RESPIRATION RATE: 20 BRPM | OXYGEN SATURATION: 99 %

## 2018-12-26 DIAGNOSIS — B34.9 VIRAL ILLNESS: Primary | ICD-10-CM

## 2018-12-26 DIAGNOSIS — R19.7 NAUSEA VOMITING AND DIARRHEA: ICD-10-CM

## 2018-12-26 DIAGNOSIS — R50.9 ACUTE FEBRILE ILLNESS: ICD-10-CM

## 2018-12-26 DIAGNOSIS — R11.2 NAUSEA VOMITING AND DIARRHEA: ICD-10-CM

## 2018-12-26 LAB
ALBUMIN SERPL-MCNC: 3.4 G/DL (ref 3.3–5.5)
ALP SERPL-CCNC: 61 U/L (ref 42–141)
B-HCG UR QL: NEGATIVE
BILIRUB SERPL-MCNC: 0.8 MG/DL (ref 0.2–1.6)
BILIRUBIN, POC UA: NEGATIVE
BLOOD, POC UA: NEGATIVE
BUN SERPL-MCNC: 10 MG/DL (ref 7–22)
CALCIUM SERPL-MCNC: 8.6 MG/DL (ref 8–10.3)
CHLORIDE SERPL-SCNC: 103 MMOL/L (ref 98–108)
CLARITY, POC UA: CLEAR
COLOR, POC UA: YELLOW
CREAT SERPL-MCNC: 0.7 MG/DL (ref 0.6–1.2)
CTP QC/QA: YES
FLUAV AG NPH QL: NEGATIVE
FLUBV AG NPH QL: NEGATIVE
GLUCOSE SERPL-MCNC: 99 MG/DL (ref 73–118)
GLUCOSE, POC UA: NEGATIVE
KETONES, POC UA: NEGATIVE
LEUKOCYTE EST, POC UA: ABNORMAL
NITRITE, POC UA: NEGATIVE
PH UR STRIP: 7 [PH]
POC ALT (SGPT): 18 U/L (ref 10–47)
POC AST (SGOT): 20 U/L (ref 11–38)
POC TCO2: 24 MMOL/L (ref 18–33)
POTASSIUM BLD-SCNC: 3.3 MMOL/L (ref 3.6–5.1)
PROTEIN, POC UA: NEGATIVE
PROTEIN, POC: 6.6 G/DL (ref 6.4–8.1)
S PYO RRNA THROAT QL PROBE: NEGATIVE
SODIUM BLD-SCNC: 141 MMOL/L (ref 128–145)
SPECIFIC GRAVITY, POC UA: 1.02
UROBILINOGEN, POC UA: >=8 E.U./DL

## 2018-12-26 PROCEDURE — 99284 EMERGENCY DEPT VISIT MOD MDM: CPT | Mod: 25

## 2018-12-26 PROCEDURE — 96374 THER/PROPH/DIAG INJ IV PUSH: CPT

## 2018-12-26 PROCEDURE — 63600175 PHARM REV CODE 636 W HCPCS: Performed by: NURSE PRACTITIONER

## 2018-12-26 PROCEDURE — 81025 URINE PREGNANCY TEST: CPT | Performed by: EMERGENCY MEDICINE

## 2018-12-26 PROCEDURE — 87081 CULTURE SCREEN ONLY: CPT

## 2018-12-26 PROCEDURE — 96361 HYDRATE IV INFUSION ADD-ON: CPT

## 2018-12-26 PROCEDURE — 25000003 PHARM REV CODE 250: Performed by: NURSE PRACTITIONER

## 2018-12-26 PROCEDURE — 87804 INFLUENZA ASSAY W/OPTIC: CPT

## 2018-12-26 PROCEDURE — 87880 STREP A ASSAY W/OPTIC: CPT

## 2018-12-26 PROCEDURE — 96375 TX/PRO/DX INJ NEW DRUG ADDON: CPT

## 2018-12-26 PROCEDURE — 81003 URINALYSIS AUTO W/O SCOPE: CPT

## 2018-12-26 PROCEDURE — 85025 COMPLETE CBC W/AUTO DIFF WBC: CPT

## 2018-12-26 PROCEDURE — 80053 COMPREHEN METABOLIC PANEL: CPT

## 2018-12-26 RX ORDER — ACETAMINOPHEN 500 MG
500 TABLET ORAL
Status: COMPLETED | OUTPATIENT
Start: 2018-12-26 | End: 2018-12-26

## 2018-12-26 RX ORDER — KETOROLAC TROMETHAMINE 30 MG/ML
10 INJECTION, SOLUTION INTRAMUSCULAR; INTRAVENOUS
Status: COMPLETED | OUTPATIENT
Start: 2018-12-26 | End: 2018-12-26

## 2018-12-26 RX ORDER — NAPROXEN 500 MG/1
500 TABLET ORAL 2 TIMES DAILY PRN
Qty: 10 TABLET | Refills: 0 | Status: SHIPPED | OUTPATIENT
Start: 2018-12-26 | End: 2018-12-31

## 2018-12-26 RX ORDER — AMLODIPINE BESYLATE 5 MG/1
10 TABLET ORAL
Status: COMPLETED | OUTPATIENT
Start: 2018-12-26 | End: 2018-12-26

## 2018-12-26 RX ORDER — ONDANSETRON 4 MG/1
8 TABLET, ORALLY DISINTEGRATING ORAL
Status: DISCONTINUED | OUTPATIENT
Start: 2018-12-26 | End: 2018-12-26

## 2018-12-26 RX ORDER — ONDANSETRON 2 MG/ML
4 INJECTION INTRAMUSCULAR; INTRAVENOUS
Status: COMPLETED | OUTPATIENT
Start: 2018-12-26 | End: 2018-12-26

## 2018-12-26 RX ORDER — ONDANSETRON 4 MG/1
4 TABLET, FILM COATED ORAL EVERY 8 HOURS PRN
Qty: 12 TABLET | Refills: 0 | Status: SHIPPED | OUTPATIENT
Start: 2018-12-26

## 2018-12-26 RX ORDER — AMLODIPINE AND OLMESARTAN MEDOXOMIL 10; 40 MG/1; MG/1
1 TABLET ORAL DAILY
COMMUNITY
End: 2020-10-21 | Stop reason: ALTCHOICE

## 2018-12-26 RX ADMIN — AMLODIPINE BESYLATE 10 MG: 5 TABLET ORAL at 07:12

## 2018-12-26 RX ADMIN — KETOROLAC TROMETHAMINE 10 MG: 30 INJECTION, SOLUTION INTRAMUSCULAR at 07:12

## 2018-12-26 RX ADMIN — ONDANSETRON 4 MG: 2 INJECTION INTRAMUSCULAR; INTRAVENOUS at 07:12

## 2018-12-26 RX ADMIN — ACETAMINOPHEN 500 MG: 500 TABLET, FILM COATED ORAL at 07:12

## 2018-12-26 RX ADMIN — SODIUM CHLORIDE 1000 ML: 0.9 INJECTION, SOLUTION INTRAVENOUS at 07:12

## 2018-12-27 NOTE — ED PROVIDER NOTES
Encounter Date: 12/26/2018       History     Chief Complaint   Patient presents with    Emesis     x 1 episode today with nausea    Diarrhea     x 4 episodes today.      Generalized Body Aches     onset today with chills with temp 101.7 about 1430.  Pt reports taking tylenol.     CC:  Vomiting, Diarrhea, Body Aches    HPI: Jyotsna Archuleta, a 38 y.o. female that presents to the ED for a 1 day history of acute onset fever, chills, body aches, 4-5 episodes of loose stool, and 1 episode of vomiting.  She reports associated nausea.  She reports mild, occasional cough.  She reports a temperature of a 101.7° F at 3:00 p.m..  She take Tylenol around 2:30 p.m..  She reports 4 small children at home who attend  in school.  She does have a 5-month-old diagnosed with RSV last month.  She works with [employer].  She reports no abdominal pain, dysuria, vaginal bleeding or discharge.          The history is provided by the patient. No  was used.     Review of patient's allergies indicates:  No Known Allergies  Past Medical History:   Diagnosis Date    Hypertension      No past surgical history on file.  No family history on file.  Social History     Tobacco Use    Smoking status: Current Every Day Smoker    Smokeless tobacco: Never Used   Substance Use Topics    Alcohol use: No    Drug use: No     Review of Systems   Constitutional: Positive for chills and fever.   HENT: Negative for ear pain, rhinorrhea, sore throat and trouble swallowing.    Respiratory: Positive for cough. Negative for shortness of breath.    Cardiovascular: Negative for chest pain.   Gastrointestinal: Positive for diarrhea, nausea and vomiting. Negative for abdominal pain.   Genitourinary: Negative for dysuria, vaginal bleeding and vaginal discharge.   Musculoskeletal: Positive for myalgias. Negative for neck stiffness.   Skin: Negative for rash and wound.   Neurological: Negative for syncope, weakness and headaches.    Psychiatric/Behavioral: Negative for confusion.       Physical Exam     Initial Vitals [12/26/18 1843]   BP Pulse Resp Temp SpO2   (!) 149/111 (!) 116 17 99.5 °F (37.5 °C) 98 %      MAP       --         Physical Exam    Nursing note and vitals reviewed.  Constitutional: She appears well-developed and well-nourished. She is not diaphoretic. She is cooperative.  Non-toxic appearance. She does not have a sickly appearance. She does not appear ill. No distress.   Appears uncomfortable, covered with a blanket.   HENT:   Head: Normocephalic and atraumatic.   Right Ear: Tympanic membrane and external ear normal. Tympanic membrane is not erythematous.   Left Ear: Tympanic membrane and external ear normal. Tympanic membrane is not erythematous.   Nose: Mucosal edema present. No rhinorrhea.   Mouth/Throat: Oropharynx is clear and moist. Mucous membranes are dry (Mild). No trismus in the jaw.   Eyes: Conjunctivae and EOM are normal.   Neck: Trachea normal, normal range of motion and phonation normal. No tracheal deviation and normal range of motion present. No neck rigidity.   Cardiovascular: Regular rhythm. Tachycardia present.  Exam reveals no gallop and no friction rub.    No murmur heard.  Pulses:       Radial pulses are 2+ on the right side, and 2+ on the left side.   Pulmonary/Chest: Effort normal and breath sounds normal. No stridor. No tachypnea and no bradypnea. No respiratory distress. She has no wheezes. She has no rhonchi. She has no rales. She exhibits no tenderness.   Abdominal: Soft. She exhibits no distension and no mass. There is no tenderness. There is no rigidity, no rebound and no guarding.   Musculoskeletal: Normal range of motion.   Lymphadenopathy:     She has no cervical adenopathy.        Right cervical: No superficial cervical adenopathy present.       Left cervical: No superficial cervical adenopathy present.   Neurological: She is alert and oriented to person, place, and time. She has normal  strength. No sensory deficit. Coordination and gait normal. GCS eye subscore is 4. GCS verbal subscore is 5. GCS motor subscore is 6.   Skin: Skin is warm, dry and intact. Capillary refill takes less than 2 seconds. No bruising and no rash noted. No cyanosis or erythema. Nails show no clubbing.   Psychiatric: She has a normal mood and affect. Her behavior is normal. Thought content normal.         ED Course   Procedures  Labs Reviewed   POCT URINALYSIS W/O SCOPE - Abnormal; Notable for the following components:       Result Value    Glucose, UA Negative (*)     Bilirubin, UA Negative (*)     Ketones, UA Negative (*)     Blood, UA Negative (*)     Protein, UA Negative (*)     Urobilinogen, UA >=8.0 (*)     Nitrite, UA Negative (*)     Leukocytes, UA Trace (*)     All other components within normal limits   POCT CMP - Abnormal; Notable for the following components:    POC Potassium 3.3 (*)     All other components within normal limits   CULTURE, STREP A,  THROAT   POCT URINE PREGNANCY   POCT INFLUENZA A/B   POCT RAPID STREP A   POCT CBC   POCT URINALYSIS W/O SCOPE   POCT CMP          Imaging Results    None                APC / Resident Notes:   This is an evaluation of a 38 y.o. female that presents to the Emergency Department for fever with body aches, nausea, 1 episode of vomiting, and multiple episodes of diarrhea. Physical Exam shows a non-toxic, afebrile, uncomfortable however overall well appearing, smiling, and interactive female.  Ears and throat without evidence infection.  Mildly dehydrated.  No cervical lymphadenopathy or meningeal signs.  Breath sounds clear and equal.  Heart regular rhythm, tachycardic.  Abdomen soft and nontender.  She moves all extremities. Vital signs are reassuring. If available, previous records reviewed. RESULTS:  UPT negative.  Urinalysis was trace leukocyte.  CMP with potassium 3.3, otherwise unremarkable.  CBC with a white count 6.6, no anemia.  Normal platelet count.   Reassessment:  After fluids and medications, patient reports he is feeling better.  She is tolerating fluids with no vomiting. Her heart rate is improved as well as her blood pressure.    My overall impression is acute febrile illness, viral illness with vomiting, nausea, diarrhea. I considered, but at this time, do not suspect OM, OE, strep pharyngitis, meningitis, pneumonia, UTI, pyelonephritis, appendicitis, bowel obstruction.  Patient has a low potassium at 3.3, patient was advised of this, she reports her potassium is always low.  Encouraged her to eat potassium rich foods.    D/C Meds:  Naproxen, Zofran. D/C Information:  Hydration, bland diet progress as tolerated. The diagnosis, treatment plan, instructions for follow-up and reevaluation with PCP as well as ED return precautions were discussed and understanding was verbalized. All questions or concerns have been addressed.  JAMES Rankin, GRISEL-C                 Clinical Impression:   The primary encounter diagnosis was Viral illness. Diagnoses of Nausea vomiting and diarrhea and Acute febrile illness were also pertinent to this visit.      Disposition:   Disposition: Discharged  Condition: Stable                        GRISEL Rowe  12/26/18 2023

## 2018-12-29 LAB — BACTERIA THROAT CULT: NORMAL

## 2019-09-24 NOTE — DISCHARGE INSTRUCTIONS
Medication(s) Requested: Tramadol  Last office visit: 8/12/19  Last refill: 6/10/19 # 120   Is the patient due for refill of this medication(s): Yes  PDMP review: Criteria met. Forwarded to Physician/MP for signature.    Ok to refill, if so please sign and close this encounter.        Please return to the Emergency Department for any new or worsening symptoms including: continued vomiting, abdominal pain, fever, chest pain, shortness of breath, loss of consciousness, dizziness, weakness, or any other concerns.     Please follow up with your Primary Care Provider within in the week. If you do not have one, you may contact the one listed on your discharge paperwork or you may also call the [facility] Appointment Desk at [phone] to schedule an appointment with one.     Please take all medication as prescribed. Zofran as needed for nausea or vomiting. You have been prescribed Naproxen for pain. This is an Non-Steroidal Anti-Inflammatory (NSAID) Medication. Please do not take any additional NSAIDs while you are taking this medication including (Advil, Aleve, Motrin, Ibuprofen, Mobic\meloxicam, Naprosyn, Toradol, ketoralac, etc.). Please stop taking this medication if you experience: weakness, itching, yellow skin or eyes, joint pains, vomiting blood, blood or black stools, unusual weight gain, or swelling in your arms, legs, hands, or feet.

## 2020-10-21 ENCOUNTER — OFFICE VISIT (OUTPATIENT)
Dept: FAMILY MEDICINE | Facility: CLINIC | Age: 40
End: 2020-10-21
Payer: COMMERCIAL

## 2020-10-21 VITALS
SYSTOLIC BLOOD PRESSURE: 160 MMHG | WEIGHT: 157.31 LBS | TEMPERATURE: 99 F | DIASTOLIC BLOOD PRESSURE: 100 MMHG | HEART RATE: 80 BPM | HEIGHT: 66 IN | OXYGEN SATURATION: 97 % | BODY MASS INDEX: 25.28 KG/M2

## 2020-10-21 DIAGNOSIS — I10 HYPERTENSION, UNSPECIFIED TYPE: ICD-10-CM

## 2020-10-21 DIAGNOSIS — Z00.00 ENCOUNTER FOR MEDICAL EXAMINATION TO ESTABLISH CARE: Primary | ICD-10-CM

## 2020-10-21 DIAGNOSIS — F41.1 GENERALIZED ANXIETY DISORDER WITH PANIC ATTACKS: ICD-10-CM

## 2020-10-21 DIAGNOSIS — F41.0 GENERALIZED ANXIETY DISORDER WITH PANIC ATTACKS: ICD-10-CM

## 2020-10-21 DIAGNOSIS — Z23 NEED FOR TDAP VACCINATION: ICD-10-CM

## 2020-10-21 PROCEDURE — 90715 TDAP VACCINE 7 YRS/> IM: CPT | Mod: S$GLB,,, | Performed by: FAMILY MEDICINE

## 2020-10-21 PROCEDURE — 90715 TDAP VACCINE GREATER THAN OR EQUAL TO 7YO IM: ICD-10-PCS | Mod: S$GLB,,, | Performed by: FAMILY MEDICINE

## 2020-10-21 PROCEDURE — 3008F PR BODY MASS INDEX (BMI) DOCUMENTED: ICD-10-PCS | Mod: CPTII,S$GLB,, | Performed by: FAMILY MEDICINE

## 2020-10-21 PROCEDURE — 90471 TDAP VACCINE GREATER THAN OR EQUAL TO 7YO IM: ICD-10-PCS | Mod: S$GLB,,, | Performed by: FAMILY MEDICINE

## 2020-10-21 PROCEDURE — 99204 PR OFFICE/OUTPT VISIT, NEW, LEVL IV, 45-59 MIN: ICD-10-PCS | Mod: 25,S$GLB,, | Performed by: FAMILY MEDICINE

## 2020-10-21 PROCEDURE — 99999 PR PBB SHADOW E&M-EST. PATIENT-LVL III: ICD-10-PCS | Mod: PBBFAC,,, | Performed by: FAMILY MEDICINE

## 2020-10-21 PROCEDURE — 3008F BODY MASS INDEX DOCD: CPT | Mod: CPTII,S$GLB,, | Performed by: FAMILY MEDICINE

## 2020-10-21 PROCEDURE — 90471 IMMUNIZATION ADMIN: CPT | Mod: S$GLB,,, | Performed by: FAMILY MEDICINE

## 2020-10-21 PROCEDURE — 99204 OFFICE O/P NEW MOD 45 MIN: CPT | Mod: 25,S$GLB,, | Performed by: FAMILY MEDICINE

## 2020-10-21 PROCEDURE — 99999 PR PBB SHADOW E&M-EST. PATIENT-LVL III: CPT | Mod: PBBFAC,,, | Performed by: FAMILY MEDICINE

## 2020-10-21 RX ORDER — LOSARTAN POTASSIUM 25 MG/1
25 TABLET ORAL DAILY
Qty: 30 TABLET | Refills: 0 | Status: SHIPPED | OUTPATIENT
Start: 2020-10-21 | End: 2020-10-21

## 2020-10-21 RX ORDER — LOSARTAN POTASSIUM AND HYDROCHLOROTHIAZIDE 12.5; 1 MG/1; MG/1
TABLET ORAL
COMMUNITY
Start: 2020-04-14 | End: 2020-10-21 | Stop reason: ALTCHOICE

## 2020-10-21 RX ORDER — ESCITALOPRAM OXALATE 10 MG/1
10 TABLET ORAL DAILY
Qty: 30 TABLET | Refills: 1 | Status: SHIPPED | OUTPATIENT
Start: 2020-10-21 | End: 2021-03-24

## 2020-10-21 NOTE — PROGRESS NOTES
Assessment & Plan  Encounter for medical examination to establish care    Generalized anxiety disorder with panic attacks  Comments:  Start Lexapro 10 mg qd. Counseled on benefits of yoga and meditation. Provided card for  to set up counseling services. TSH pending.  Orders:  -     TSH; Future; Expected date: 10/21/2020  -     escitalopram oxalate (LEXAPRO) 10 MG tablet; Take 1 tablet (10 mg total) by mouth once daily.  Dispense: 30 tablet; Refill: 1    Hypertension, unspecified type  Comments:  /100 today. Suspect this is related to anxiety. Start losartan 25 mg po qd. Keep track of BP 3x a week and bring log to review @ next f/u visit.   Orders:  -     CBC auto differential; Future; Expected date: 10/21/2020  -     Comprehensive Metabolic Panel; Future; Expected date: 10/21/2020  -     Lipid Panel; Future; Expected date: 10/21/2020  -     losartan (COZAAR) 25 MG tablet; Take 1 tablet (25 mg total) by mouth once daily.  Dispense: 30 tablet; Refill: 0    Need for Tdap vaccination  -     Tdap Vaccine      Health Maintenance reviewed.    Follow-up: Follow up in about 1 month (around 11/21/2020).  ______________________________________________________________________    Chief Complaint  Chief Complaint   Patient presents with    Anxiety       HPI  Jyotsna Archuleta is a 39 y.o. female with PMHx HTN and seasonal allergies that presents to the office to establish care, as well as discuss anxiety. She is a mother of 4 children and employed. She reports having daily anxiety, panic attacks, appetite loss, and poor quality of sleep that peaked during the pandemic. Stressors include a loss of her usual outlets to relieve stress, changes in her job, and the every dealings of being a mother and wife. She states that she is up all hours of the night and has tried yoga and cooking as outlets to alleviate stress. She denies SI or HI. She states that she does not have a good support system in her life. She  emphasizes that she does not want any medications that are habit forming.     Patient also states that she is currently taking methyldopa left over from her previous pregnancy for gestational HTN. BP is 160/100 in the office today. She reports success with taking Yarelis in the past but it was a lengthy process to get it approved by her insurance.     Health maintenance:  -Influenza vaccine - declines  -Tetanus booster - agreeable  -Pap smear - 2 years ago during pregnancy, normal  -FDLMP - 10/7, regular cycles      PAST MEDICAL HISTORY:  Past Medical History:   Diagnosis Date    Hypertension        PAST SURGICAL HISTORY:  History reviewed. No pertinent surgical history.    SOCIAL HISTORY:  Social History     Socioeconomic History    Marital status:      Spouse name: Not on file    Number of children: Not on file    Years of education: Not on file    Highest education level: Not on file   Occupational History    Not on file   Social Needs    Financial resource strain: Not on file    Food insecurity     Worry: Not on file     Inability: Not on file    Transportation needs     Medical: Not on file     Non-medical: Not on file   Tobacco Use    Smoking status: Current Every Day Smoker    Smokeless tobacco: Never Used   Substance and Sexual Activity    Alcohol use: No    Drug use: No    Sexual activity: Not on file   Lifestyle    Physical activity     Days per week: Not on file     Minutes per session: Not on file    Stress: Not on file   Relationships    Social connections     Talks on phone: Not on file     Gets together: Not on file     Attends Jain service: Not on file     Active member of club or organization: Not on file     Attends meetings of clubs or organizations: Not on file     Relationship status: Not on file   Other Topics Concern    Not on file   Social History Narrative    Not on file       FAMILY HISTORY:  No family history on file.    ALLERGIES AND MEDICATIONS: updated and  "reviewed.  Review of patient's allergies indicates:  No Known Allergies  Current Outpatient Medications   Medication Sig Dispense Refill    escitalopram oxalate (LEXAPRO) 10 MG tablet Take 1 tablet (10 mg total) by mouth once daily. 30 tablet 1    losartan (COZAAR) 25 MG tablet Take 1 tablet (25 mg total) by mouth once daily. 30 tablet 0    methyldopa (ALDOMET) 500 MG tablet Take 500 mg by mouth 2 (two) times daily.      ondansetron (ZOFRAN) 4 MG tablet Take 1 tablet (4 mg total) by mouth every 8 (eight) hours as needed for Nausea. (Patient not taking: Reported on 10/21/2020) 12 tablet 0     No current facility-administered medications for this visit.          ROS  Review of Systems   Constitutional: Positive for appetite change (loss) and unexpected weight change (loss). Negative for activity change, fatigue and fever.   Respiratory: Positive for chest tightness and shortness of breath (attributed to anxiety). Negative for cough.    Cardiovascular: Negative for palpitations and leg swelling.   Genitourinary: Negative for menstrual problem and vaginal bleeding.   Neurological: Negative for dizziness and headaches.   Psychiatric/Behavioral: Positive for sleep disturbance. Negative for dysphoric mood and suicidal ideas. The patient is nervous/anxious.            Physical Exam  Vitals:    10/21/20 1504   BP: (!) 160/100   BP Location: Left arm   Patient Position: Sitting   BP Method: Large (Manual)   Pulse: 80   Temp: 98.7 °F (37.1 °C)   TempSrc: Oral   SpO2: 97%   Weight: 71.3 kg (157 lb 4.8 oz)   Height: 5' 6" (1.676 m)    Body mass index is 25.39 kg/m².  Weight: 71.3 kg (157 lb 4.8 oz)   Height: 5' 6" (167.6 cm)   Physical Exam  Constitutional:       General: She is not in acute distress.     Appearance: Normal appearance.   HENT:      Head: Normocephalic and atraumatic.      Right Ear: Tympanic membrane and ear canal normal.      Left Ear: Tympanic membrane and ear canal normal.      Nose: Nose normal.      " Mouth/Throat:      Mouth: Mucous membranes are moist.      Pharynx: Oropharynx is clear.   Eyes:      Conjunctiva/sclera: Conjunctivae normal.      Pupils: Pupils are equal, round, and reactive to light.   Neck:      Musculoskeletal: Neck supple.      Thyroid: No thyromegaly.   Cardiovascular:      Rate and Rhythm: Normal rate and regular rhythm.      Pulses: Normal pulses.      Heart sounds: Normal heart sounds.   Pulmonary:      Effort: Pulmonary effort is normal. No respiratory distress.      Breath sounds: Normal breath sounds.   Abdominal:      General: Abdomen is flat. Bowel sounds are normal. There is no distension.      Palpations: Abdomen is soft.      Tenderness: There is no abdominal tenderness.   Musculoskeletal:         General: No tenderness.      Right lower leg: No edema.      Left lower leg: No edema.   Skin:     General: Skin is warm and dry.      Findings: No rash.   Neurological:      General: No focal deficit present.      Mental Status: She is alert and oriented to person, place, and time.      Cranial Nerves: No cranial nerve deficit.   Psychiatric:         Attention and Perception: Attention normal.         Mood and Affect: Mood is anxious.         Speech: Speech normal.         Behavior: Behavior normal.         Thought Content: Thought content normal.             Health Maintenance       Date Due Completion Date    Hepatitis C Screening 1982 ---    Lipid Panel 1982 ---    HIV Screening 10/23/1997 ---    Pneumococcal Vaccine (Medium Risk) (1 of 1 - PPSV23) 10/23/2001 ---    Cervical Cancer Screening 10/23/2003 ---    Influenza Vaccine (1) 06/30/2021 (Originally 8/1/2020) ---    TETANUS VACCINE 10/21/2030 10/21/2020 (Done)    Override on 10/21/2020: Done

## 2020-10-21 NOTE — PATIENT INSTRUCTIONS
· You have received the tetanus booster vaccine.  · Take Lexapro 10 mg once a day.   · We will schedule you for fasting blood work.   · Follow up in 1 month.

## 2020-11-15 ENCOUNTER — HOSPITAL ENCOUNTER (EMERGENCY)
Facility: HOSPITAL | Age: 40
Discharge: HOME | End: 2020-11-15
Attending: EMERGENCY MEDICINE
Payer: COMMERCIAL

## 2020-11-15 VITALS
HEART RATE: 80 BPM | SYSTOLIC BLOOD PRESSURE: 185 MMHG | OXYGEN SATURATION: 100 % | WEIGHT: 150 LBS | RESPIRATION RATE: 20 BRPM | TEMPERATURE: 98 F | DIASTOLIC BLOOD PRESSURE: 112 MMHG

## 2020-11-15 DIAGNOSIS — S90.129A CONTUSION OF TOE WITHOUT DAMAGE TO NAIL, UNSPECIFIED LATERALITY, UNSPECIFIED TOE, INITIAL ENCOUNTER: Primary | ICD-10-CM

## 2020-11-15 DIAGNOSIS — S99.922A FOOT INJURY, LEFT, INITIAL ENCOUNTER: ICD-10-CM

## 2020-11-15 LAB
B-HCG UR QL: NEGATIVE
CTP QC/QA: YES

## 2020-11-15 PROCEDURE — 81025 URINE PREGNANCY TEST: CPT | Mod: ER | Performed by: PHYSICIAN ASSISTANT

## 2020-11-15 PROCEDURE — 99283 EMERGENCY DEPT VISIT LOW MDM: CPT | Mod: 25,ER

## 2020-11-15 RX ORDER — LOSARTAN POTASSIUM 25 MG/1
25 TABLET ORAL DAILY
COMMUNITY
End: 2021-03-24 | Stop reason: ALTCHOICE

## 2020-11-15 RX ORDER — AMLODIPINE BESYLATE 10 MG/1
5 TABLET ORAL DAILY
COMMUNITY
End: 2021-03-24 | Stop reason: ALTCHOICE

## 2020-11-15 NOTE — DISCHARGE INSTRUCTIONS
There is no clear acute fracture of either toe identified today on your X-rays. You may still have a fracture that is not obvious today, and should be re-evaluated in 1 week if pain persists.   Continue ibuprofen for pain.

## 2020-11-15 NOTE — ED PROVIDER NOTES
"Encounter Date: 11/15/2020    SCRIBE #1 NOTE: I, Mackenzie Palafox, am scribing for, and in the presence of,  MAYNOR Oneal. I have scribed the following portions of the note - Other sections scribed: HPI, ROS, PE.       History     Chief Complaint   Patient presents with    Foot Pain     Pt states," A wooden chair fell on my left foot this morning.My first two toes are hurting.""     Jyotsna Dee is a 40 y.o. female with HTN who presents to the ED for evaluation of acute, 8/10 pain with bruising and swelling to 1st and 2nd digit of left foot x this morning. Patient reports a wooden dining room chair accidentally fell on her foot. Notes a laceration to left great toe. Denies numbness, weakness and tingling. Denies attempted treatment with OTC and/or Rx pain medications PTA. Bleeding controlled PTA.       The history is provided by the patient. No  was used.     Review of patient's allergies indicates:  No Known Allergies  Past Medical History:   Diagnosis Date    Hypertension      History reviewed. No pertinent surgical history.  History reviewed. No pertinent family history.  Social History     Tobacco Use    Smoking status: Current Every Day Smoker    Smokeless tobacco: Never Used   Substance Use Topics    Alcohol use: Not Currently    Drug use: Never     Review of Systems   Respiratory: Negative for shortness of breath.    Cardiovascular: Negative for chest pain.   Musculoskeletal: Positive for arthralgias, gait problem and joint swelling.   Skin: Positive for color change and wound. Negative for rash.   Neurological: Negative for weakness and numbness.   All other systems reviewed and are negative.      Physical Exam     Initial Vitals [11/15/20 1329]   BP Pulse Resp Temp SpO2   (!) 197/123 92 16 98.1 °F (36.7 °C) 99 %      MAP       --         Physical Exam    Nursing note and vitals reviewed.  Constitutional: She appears well-developed and well-nourished. No distress. "   HENT:   Head: Normocephalic and atraumatic.   Right Ear: Hearing and external ear normal.   Left Ear: Hearing and external ear normal.   Mouth/Throat: Oropharynx is clear and moist.   Eyes: Conjunctivae and EOM are normal. Pupils are equal, round, and reactive to light.   Neck: Normal range of motion. Neck supple.   Cardiovascular: Normal rate and regular rhythm.   Pulmonary/Chest: Effort normal. No respiratory distress.   Musculoskeletal: Normal range of motion. No tenderness or edema.      Comments:   Tenderness localized to 1st and 2nd digit of left foot with associated swelling and ecchymosis. Sensation intact.     Superficial laceration localized to dorsal aspect of left great toe.    Skin: Skin is warm and dry. Capillary refill takes less than 2 seconds. No rash noted.   Psychiatric: She has a normal mood and affect. Her speech is normal and behavior is normal. Thought content normal.         ED Course   Procedures  Labs Reviewed   POCT URINE PREGNANCY          Imaging Results          X-Ray Foot Complete Left (Final result)  Result time 11/15/20 13:58:35    Final result by Fabio Tolentino MD (11/15/20 13:58:35)                 Impression:      1. No acute displaced fracture or dislocation of the foot.      Electronically signed by: Fabio Tolentino MD  Date:    11/15/2020  Time:    13:58             Narrative:    EXAMINATION:  XR FOOT COMPLETE 3 VIEW LEFT    CLINICAL HISTORY:  .  Unspecified injury of left foot, initial encounter    TECHNIQUE:  AP, lateral and oblique views of the left foot were performed.    COMPARISON:  None    FINDINGS:  Three views left foot.    No acute displaced fracture or dislocation of the foot.  No radiopaque foreign body.  No significant edema.                              X-Rays:   Independently Interpreted Readings:   Other Readings:  Xray left foot with no obvious acute fracture. Small fragment at proximal aspect of distal phalanx.    Medical Decision Making:   History:    Old Medical Records: I decided to obtain old medical records.  Independently Interpreted Test(s):   I have ordered and independently interpreted X-rays - see prior notes.  Clinical Tests:   Lab Tests: Ordered and Reviewed  The following lab test(s) were unremarkable: UPT  Radiological Study: Ordered and Reviewed  ED Management:  40-year-old female with toe pain from chair falling on it.  No evidence of infection.  Tetanus up-to-date.  Toes are neurovascularly intact.  X-ray without obvious acute fracture.  Small bone fragment/ossicle at the proximal aspect of the distal phalanx appears either as an old fracture or rounded ossicle.  Postop shoe placed.  Will have patient follow-up with orthopedics.            Scribe Attestation:   Scribe #1: I performed the above scribed service and the documentation accurately describes the services I performed. I attest to the accuracy of the note.               Scribe attestation: I, Rick Douglas, personally performed the services described in this documentation.  All medical record entries made by the scribe were at my direction and in my presence.  I have reviewed the chart and agree that the record reflects my personal performance and is accurate and complete.         Clinical Impression:     ICD-10-CM ICD-9-CM   1. Contusion of toe without damage to nail, unspecified laterality, unspecified toe, initial encounter  S90.129A 924.3   2. Foot injury, left, initial encounter  S99.922A 959.7                          ED Disposition Condition    Discharge Stable        ED Prescriptions     None        Follow-up Information     Follow up With Specialties Details Why Contact Info    Zelalem Bright MD Orthopedic Surgery Schedule an appointment as soon as possible for a visit in 1 week For re-evaluation [address]  [address]  [place] [address]  [phone]      [facility] Emergency Department Emergency Medicine Go to  If symptoms worsen [address]  [address]  [phone]                                       KAREN PhelpsC  11/15/20 [id number]

## 2020-11-15 NOTE — ED TRIAGE NOTES
Pt reports left great and 2nd toe pain and bruising after a large wooden chair fell on her foot approximately 45 mins PTA.

## 2020-12-24 DIAGNOSIS — F41.0 GENERALIZED ANXIETY DISORDER WITH PANIC ATTACKS: ICD-10-CM

## 2020-12-24 DIAGNOSIS — F41.1 GENERALIZED ANXIETY DISORDER WITH PANIC ATTACKS: ICD-10-CM

## 2020-12-28 ENCOUNTER — PATIENT MESSAGE (OUTPATIENT)
Dept: FAMILY MEDICINE | Facility: CLINIC | Age: 40
End: 2020-12-28

## 2020-12-28 RX ORDER — ESCITALOPRAM OXALATE 10 MG/1
TABLET ORAL
Qty: 30 TABLET | Refills: 1 | OUTPATIENT
Start: 2020-12-28

## 2020-12-28 NOTE — TELEPHONE ENCOUNTER
Patient notified that her request for a refill of Lexapro was not approved for reasons in message below and verbalized understanding.  Appointment with Dr. Connor scheduled for 1/7/2021.

## 2021-03-24 ENCOUNTER — PATIENT MESSAGE (OUTPATIENT)
Dept: FAMILY MEDICINE | Facility: CLINIC | Age: 41
End: 2021-03-24

## 2021-03-24 DIAGNOSIS — I10 ESSENTIAL HYPERTENSION: Primary | ICD-10-CM

## 2021-03-24 RX ORDER — AMLODIPINE AND OLMESARTAN MEDOXOMIL 10; 40 MG/1; MG/1
1 TABLET ORAL DAILY
Qty: 90 TABLET | Refills: 3 | Status: SHIPPED | OUTPATIENT
Start: 2021-03-24 | End: 2022-03-24

## 2021-04-16 ENCOUNTER — PATIENT MESSAGE (OUTPATIENT)
Dept: RESEARCH | Facility: HOSPITAL | Age: 41
End: 2021-04-16

## 2023-02-25 ENCOUNTER — HOSPITAL ENCOUNTER (EMERGENCY)
Facility: HOSPITAL | Age: 43
Discharge: HOME | End: 2023-02-25
Attending: EMERGENCY MEDICINE
Payer: COMMERCIAL

## 2023-02-25 VITALS
WEIGHT: 160 LBS | SYSTOLIC BLOOD PRESSURE: 190 MMHG | OXYGEN SATURATION: 98 % | BODY MASS INDEX: 26.66 KG/M2 | DIASTOLIC BLOOD PRESSURE: 92 MMHG | HEART RATE: 82 BPM | TEMPERATURE: 98 F | HEIGHT: 65 IN | RESPIRATION RATE: 17 BRPM

## 2023-02-25 DIAGNOSIS — J06.9 VIRAL URI: ICD-10-CM

## 2023-02-25 DIAGNOSIS — H66.92 LEFT OTITIS MEDIA, UNSPECIFIED OTITIS MEDIA TYPE: Primary | ICD-10-CM

## 2023-02-25 LAB
B-HCG UR QL: NEGATIVE
CTP QC/QA: YES
CTP QC/QA: YES
INFLUENZA A ANTIGEN, POC: NEGATIVE
INFLUENZA B ANTIGEN, POC: NEGATIVE
SARS-COV-2 RDRP RESP QL NAA+PROBE: NEGATIVE

## 2023-02-25 PROCEDURE — 81025 URINE PREGNANCY TEST: CPT | Mod: ER | Performed by: EMERGENCY MEDICINE

## 2023-02-25 PROCEDURE — 87804 INFLUENZA ASSAY W/OPTIC: CPT | Mod: 59,ER

## 2023-02-25 PROCEDURE — 99283 EMERGENCY DEPT VISIT LOW MDM: CPT | Mod: ER

## 2023-02-25 RX ORDER — CETIRIZINE HYDROCHLORIDE, PSEUDOEPHEDRINE HYDROCHLORIDE 5; 120 MG/1; MG/1
1 TABLET, FILM COATED, EXTENDED RELEASE ORAL DAILY
Qty: 10 TABLET | Refills: 0 | Status: SHIPPED | OUTPATIENT
Start: 2023-02-25 | End: 2023-03-02

## 2023-02-25 RX ORDER — AMOXICILLIN AND CLAVULANATE POTASSIUM 875; 125 MG/1; MG/1
1 TABLET, FILM COATED ORAL 2 TIMES DAILY
Qty: 14 TABLET | Refills: 0 | Status: SHIPPED | OUTPATIENT
Start: 2023-02-25 | End: 2023-03-04

## 2023-02-25 NOTE — DISCHARGE INSTRUCTIONS
Take medications as directed. You can continue motrin or tylenol for pain or fever. Return to ER if you are not better after completing this treatment.

## 2023-02-25 NOTE — ED PROVIDER NOTES
"Encounter Date: 2/25/2023    SCRIBE #1 NOTE: I, Sherry Quintero, am scribing for, and in the presence of,  Marii Stearns MD. I have scribed the following portions of the note - Other sections scribed: HPI, ROS, PE.     History     Chief Complaint   Patient presents with    Otalgia     Left ear pain, swelling behind left ear also     Jyotsna Dee is a 42 y.o. female, with a past medical history of HTN, who presents to the ED with left-sided ear pain and swelling behind left ear that began 4 days ago. Patient states her left ear feels "muffled". Patient reports she has been sick for 3 days. Patient reports taking Cloricidin with relief. No other exacerbating or alleviating factors. Patient has associated symptoms of intermittent fever (max 102), cough, chills, and rhinorrhea. Patient denies smoking tobacco, EtOH or illicit drug usage.      The history is provided by the patient. No  was used.   Review of patient's allergies indicates:  No Known Allergies  Past Medical History:   Diagnosis Date    Hypertension      History reviewed. No pertinent surgical history.  History reviewed. No pertinent family history.  Social History     Tobacco Use    Smoking status: Every Day    Smokeless tobacco: Never   Substance Use Topics    Alcohol use: Not Currently    Drug use: Never     Review of Systems   Constitutional:  Positive for chills and fever (intermittent, max 102 F). Negative for activity change and appetite change.   HENT:  Positive for ear pain (left sided, swelling behind ear) and rhinorrhea. Negative for congestion, sneezing and sore throat.    Respiratory:  Positive for cough. Negative for choking, shortness of breath and wheezing.    Cardiovascular:  Negative for chest pain and palpitations.   Gastrointestinal:  Negative for abdominal pain, diarrhea, nausea and vomiting.   Neurological:  Negative for dizziness, syncope, light-headedness and headaches.   All other systems " reviewed and are negative.    Physical Exam     Initial Vitals [02/25/23 1353]   BP Pulse Resp Temp SpO2   (S) (!) 162/106 82 17 98.4 °F (36.9 °C) 98 %      MAP       --         Physical Exam    Nursing note and vitals reviewed.  Constitutional: She appears well-developed and well-nourished. No distress.   HENT:   Head: Normocephalic and atraumatic.   Left Ear: Tympanic membrane is erythematous (mild) and bulging.   No tenderness of tragus or pinna. No swelling. No vesicles. Canal is clear. Bulging TM with mild erythema.   Eyes: Conjunctivae are normal.   Neck:   Normal range of motion.  Cardiovascular:  Normal rate, regular rhythm and normal heart sounds.           No murmur heard.  Pulmonary/Chest: Breath sounds normal. No respiratory distress.   Abdominal: Bowel sounds are normal. She exhibits no distension.   Musculoskeletal:         General: Normal range of motion.      Cervical back: Normal range of motion.     Neurological: She is alert and oriented to person, place, and time.   Skin: Skin is warm and dry.   Psychiatric: She has a normal mood and affect. Her behavior is normal.       ED Course   Procedures  Labs Reviewed   POCT URINE PREGNANCY   SARS-COV-2 RDRP GENE    Narrative:     This test utilizes isothermal nucleic acid amplification technology to detect the SARS-CoV-2 RdRp nucleic acid segment. The analytical sensitivity (limit of detection) is 500 copies/swab.     A POSITIVE result is indicative of the presence of SARS-CoV-2 RNA; clinical correlation with patient history and other diagnostic information is necessary to determine patient infection status.    A NEGATIVE result means that SARS-CoV-2 nucleic acids are not present above the limit of detection. A NEGATIVE result should be treated as presumptive. It does not rule out the possibility of COVID-19 and should not be the sole basis for treatment decisions. If COVID-19 is strongly suspected based on clinical and exposure history, re-testing using  an alternate molecular assay should be considered.     This test is only for use under the Food and Drug Administration s Emergency Use Authorization (EUA).     Commercial kits are provided by [employer]. Performance characteristics of the EUA have been independently verified by [facility] Department of Pathology and Laboratory Medicine.   _________________________________________________________________   The authorized Fact Sheet for Healthcare Providers and the authorized Fact Sheet for Patients of the ID NOW COVID-19 are available on the FDA website:    https://www.fda.gov/media/114190/download      https://www.fda.gov/media/329017/download      POCT INFLUENZA A/B MOLECULAR   POCT RAPID INFLUENZA A/B          Imaging Results    None          Medications - No data to display  Medical Decision Making:   History:   Old Medical Records: I decided to obtain old medical records.  Clinical Tests:   Lab Tests: Ordered and Reviewed  ED Management:  42F with HTN presents with left sided ear pain and subjective swelling. On exam, bulging and mild erythema of left TM. No OE. No signs of mastoiditis. Pt looks non-toxic. Negative for covid and flu. Will treat symptoms with zyrtec D and treat OM with augmentin.         Scribe Attestation:   Scribe #1: I performed the above scribed service and the documentation accurately describes the services I performed. I attest to the accuracy of the note.                 I, Dr. Marii Stearns, personally performed the services described in this documentation.   All medical record entries made by the scribe were at my direction and in my presence.   I have reviewed the chart and agree that the record is accurate and complete.   Marii Stearns MD.  4:38 PM 02/25/2023     Clinical Impression:   Final diagnoses:  [H66.92] Left otitis media, unspecified otitis media type (Primary)  [J06.9] Viral URI        ED Disposition Condition    Discharge Stable          ED Prescriptions        Medication Sig Dispense Start Date End Date Auth. Provider    cetirizine-pseudoephedrine 5-120 mg Tb12 Take 1 tablet by mouth once daily. for 5 days 10 tablet 2/25/2023 3/2/2023 Marii Stearns MD    amoxicillin-clavulanate 875-125mg (AUGMENTIN) 875-125 mg per tablet Take 1 tablet by mouth 2 (two) times daily. for 7 days 14 tablet 2/25/2023 3/4/2023 Marii Steanrs MD          Follow-up Information    None          Marii Stearns MD  02/25/23 7569

## 2023-02-25 NOTE — Clinical Note
"Jyotsna"Joey Dee was seen and treated in our emergency department on 2/25/2023.  She may return to work on 02/27/2023.       If you have any questions or concerns, please don't hesitate to call.      Marii Stearns MD"

## 2024-04-16 ENCOUNTER — ON-DEMAND VIRTUAL (OUTPATIENT)
Dept: URGENT CARE | Facility: CLINIC | Age: 44
End: 2024-04-16
Payer: COMMERCIAL

## 2024-04-16 DIAGNOSIS — Z76.0 MEDICATION REFILL: Primary | ICD-10-CM

## 2024-04-16 DIAGNOSIS — I10 ESSENTIAL HYPERTENSION: ICD-10-CM

## 2024-04-16 DIAGNOSIS — H10.9 CONJUNCTIVITIS, UNSPECIFIED CONJUNCTIVITIS TYPE, UNSPECIFIED LATERALITY: ICD-10-CM

## 2024-04-16 PROCEDURE — 99203 OFFICE O/P NEW LOW 30 MIN: CPT | Mod: 95,,, | Performed by: NURSE PRACTITIONER

## 2024-04-16 RX ORDER — AMLODIPINE AND OLMESARTAN MEDOXOMIL 10; 40 MG/1; MG/1
1 TABLET ORAL DAILY
Qty: 90 TABLET | Refills: 3 | Status: SHIPPED | OUTPATIENT
Start: 2024-04-16 | End: 2025-04-16

## 2024-04-16 RX ORDER — CIPROFLOXACIN HYDROCHLORIDE 3 MG/ML
SOLUTION/ DROPS OPHTHALMIC
Qty: 15 ML | Refills: 0 | Status: SHIPPED | OUTPATIENT
Start: 2024-04-16

## 2024-04-16 NOTE — PATIENT INSTRUCTIONS
Colon/EGD scheduled for 10/08/20 with Dr Lomax [facility] instructions given by Davon MYERS in the office Recommendations:     . Do not rub eyes. Wash hands frequently and disinfect common surfaces to avoid spread.     . Discontinue contact use until symptoms improve. Avoid eye make-up as this will likely contaminate your products.     . Warm or cool compresses may be soothing.     . Artificial tears to help lubricate and rinse the eye. Refrigerated drops may be soothing as well.     For allergic conjunctivitis: use antihistamines(Claritin, Zyrtec, Allegra), daily as directed, and allergy eye drops such as Pataday or Zaditor as directed.

## 2024-04-16 NOTE — PROGRESS NOTES
Subjective:      Patient ID: Jyotsna Dee is a 43 y.o. female.    Vitals:  vitals were not taken for this visit.     Chief Complaint: Eye Problem      Visit Type: TELE AUDIOVISUAL    Present with the patient at the time of consultation: TELEMED PRESENT WITH PATIENT: None, at work    Past Medical History:   Diagnosis Date    Hypertension      No past surgical history on file.  Review of patient's allergies indicates:  No Known Allergies  Current Outpatient Medications on File Prior to Visit   Medication Sig Dispense Refill    [DISCONTINUED] amlodipine-olmesartan (JUDIT) 10-40 mg per tablet Take 1 tablet by mouth once daily. 90 tablet 3    [DISCONTINUED] methyldopa (ALDOMET) 500 MG tablet Take 500 mg by mouth 2 (two) times daily.      [DISCONTINUED] ondansetron (ZOFRAN) 4 MG tablet Take 1 tablet (4 mg total) by mouth every 8 (eight) hours as needed for Nausea. (Patient not taking: Reported on 10/21/2020) 12 tablet 0     No current facility-administered medications on file prior to visit.     No family history on file.    Medications Ordered                [facility] - [facility] - [address] [place] [address] 150   [address] [facility] [address], [place] [address]    Telephone: [phone]   Fax: [phone]   Hours: Not open 24 hours                         E-Prescribed (2 of 2)              amlodipine-olmesartan (JUDIT) 10-40 mg per tablet    Sig: Take 1 tablet by mouth once daily.       Start: 24     Quantity: 90 tablet Refills: 3                         ciprofloxacin HCl (CILOXAN) 0.3 % ophthalmic solution    Si-2 drops in eye(s) every 2 hours while awake for 2 days, then every 4 hours for 5 days       Start: 24     Quantity: 15 mL Refills: 0                           Ohs Peq Odvv Intake    2024  8:32 AM CDT - Filed by Patient   What is your current physical address in the event of a medical emergency? [address] [address]   Are you able to take your vital  signs? No   Please attach any relevant images or files          Woke up with right eye irritation. + crusting, redness and burning. No itching. No discharge. Hx of seasonal allergies. +contact use. Using lubricant drops with some relief.  Also requesting refill of HTN meds. Unable to get refills through PCP. No acute issues or concerns.    Eye Problem   Associated symptoms include eye redness. Pertinent negatives include no eye discharge or itching.       Eyes:  Positive for eye pain (burning) and eye redness. Negative for eye discharge, eye itching, vision loss and eyelid swelling.   Allergic/Immunologic: Positive for seasonal allergies.        Objective:   The physical exam was conducted virtually.  Physical Exam   Constitutional: She is oriented to person, place, and time. She does not appear ill. No distress.   HENT:   Head: Normocephalic and atraumatic.   Nose: Nose normal.   Eyes: Right conjunctiva is injected (mildly). Extraocular movement intact   Pulmonary/Chest: Effort normal.   Abdominal: Normal appearance.   Musculoskeletal: Normal range of motion.         General: Normal range of motion.   Neurological: no focal deficit. She is alert and oriented to person, place, and time.   Psychiatric: Her behavior is normal. Mood normal.   Vitals reviewed.      Assessment:     1. Medication refill    2. Essential hypertension    3. Conjunctivitis, unspecified conjunctivitis type, unspecified laterality        Plan:   Patient encouraged to monitor symptoms closely and instructed to follow-up for new or worsening symptoms. Further, in-person, evaluation may be necessary for continued treatment. Please follow up with your primary care doctor or specialist as needed. Verbally discussed plan. Patient confirms understanding and is in agreement with treatment and plan.     You must understand that you've received a [facility] evaluation only and that you may be released before all your medical problems are known or  treated. You, the patient, will arrange for follow up care as instructed.      Medication refill  -     amlodipine-olmesartan (JUDIT) 10-40 mg per tablet; Take 1 tablet by mouth once daily.  Dispense: 90 tablet; Refill: 3    Essential hypertension  -     amlodipine-olmesartan (JUDIT) 10-40 mg per tablet; Take 1 tablet by mouth once daily.  Dispense: 90 tablet; Refill: 3    Conjunctivitis, unspecified conjunctivitis type, unspecified laterality  -     ciprofloxacin HCl (CILOXAN) 0.3 % ophthalmic solution; 1-2 drops in eye(s) every 2 hours while awake for 2 days, then every 4 hours for 5 days  Dispense: 15 mL; Refill: 0        Patient Instructions   Recommendations:     . Do not rub eyes. Wash hands frequently and disinfect common surfaces to avoid spread.     . Discontinue contact use until symptoms improve. Avoid eye make-up as this will likely contaminate your products.     . Warm or cool compresses may be soothing.     . Artificial tears to help lubricate and rinse the eye. Refrigerated drops may be soothing as well.     For allergic conjunctivitis: use antihistamines(Claritin, Zyrtec, Allegra), daily as directed, and allergy eye drops such as Pataday or Zaditor as directed.

## 2024-06-28 ENCOUNTER — HOSPITAL ENCOUNTER (EMERGENCY)
Facility: HOSPITAL | Age: 44
Discharge: HOME | End: 2024-06-28
Attending: INTERNAL MEDICINE
Payer: COMMERCIAL

## 2024-06-28 VITALS
DIASTOLIC BLOOD PRESSURE: 107 MMHG | OXYGEN SATURATION: 98 % | HEIGHT: 66 IN | RESPIRATION RATE: 14 BRPM | HEART RATE: 84 BPM | WEIGHT: 140 LBS | SYSTOLIC BLOOD PRESSURE: 179 MMHG | BODY MASS INDEX: 22.5 KG/M2 | TEMPERATURE: 98 F

## 2024-06-28 DIAGNOSIS — M25.551 RIGHT HIP PAIN: ICD-10-CM
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
## 2024-06-28 LAB
B-HCG UR QL: NEGATIVE
CTP QC/QA: YES

## 2024-06-28 PROCEDURE — 81025 URINE PREGNANCY TEST: CPT | Mod: ER | Performed by: INTERNAL MEDICINE

## 2024-06-28 PROCEDURE — 63600175 PHARM REV CODE 636 W HCPCS: Mod: ER | Performed by: INTERNAL MEDICINE

## 2024-06-28 PROCEDURE — 96372 THER/PROPH/DIAG INJ SC/IM: CPT | Performed by: INTERNAL MEDICINE

## 2024-06-28 PROCEDURE — 99284 EMERGENCY DEPT VISIT MOD MDM: CPT | Mod: 25,ER

## 2024-06-28 PROCEDURE — 81025 URINE PREGNANCY TEST: CPT | Mod: ER

## 2024-06-28 RX ORDER — IBUPROFEN 800 MG/1
800 TABLET ORAL EVERY 8 HOURS PRN
Qty: 30 TABLET | Refills: 0 | Status: SHIPPED | OUTPATIENT
Start: 2024-06-28

## 2024-06-28 RX ORDER — KETOROLAC TROMETHAMINE 30 MG/ML
60 INJECTION, SOLUTION INTRAMUSCULAR; INTRAVENOUS
Status: COMPLETED | OUTPATIENT
Start: 2024-06-28 | End: 2024-06-28

## 2024-06-28 RX ORDER — METHOCARBAMOL 750 MG/1
1500 TABLET, FILM COATED ORAL 3 TIMES DAILY
Qty: 30 TABLET | Refills: 0 | Status: SHIPPED | OUTPATIENT
Start: 2024-06-28 | End: 2024-07-03

## 2024-06-28 RX ADMIN — KETOROLAC TROMETHAMINE 60 MG: 30 INJECTION, SOLUTION INTRAMUSCULAR at 08:06
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
## 2024-09-13 ENCOUNTER — OCCUPATIONAL HEALTH (OUTPATIENT)
Dept: URGENT CARE | Facility: CLINIC | Age: 44
End: 2024-09-13

## 2024-09-13 DIAGNOSIS — Z02.83 ENCOUNTER FOR DRUG SCREENING: Primary | ICD-10-CM

## 2024-09-13 LAB
CTP QC/QA: YES
POC 5 PANEL DRUG SCREEN: NEGATIVE

## 2025-06-30 ENCOUNTER — OCCUPATIONAL HEALTH (OUTPATIENT)
Dept: URGENT CARE | Facility: CLINIC | Age: 45
End: 2025-06-30

## 2025-06-30 DIAGNOSIS — Z02.83 ENCOUNTER FOR DRUG SCREENING: Primary | ICD-10-CM

## 2025-06-30 LAB
CTP QC/QA: YES
POC 5 PANEL DRUG SCREEN: NEGATIVE

## 2025-06-30 PROCEDURE — 80305 DRUG TEST PRSMV DIR OPT OBS: CPT | Mod: S$GLB,,, | Performed by: NURSE PRACTITIONER
